# Patient Record
Sex: FEMALE | Race: WHITE | NOT HISPANIC OR LATINO | Employment: FULL TIME | URBAN - METROPOLITAN AREA
[De-identification: names, ages, dates, MRNs, and addresses within clinical notes are randomized per-mention and may not be internally consistent; named-entity substitution may affect disease eponyms.]

---

## 2017-01-05 ENCOUNTER — APPOINTMENT (OUTPATIENT)
Dept: PHYSICAL THERAPY | Facility: CLINIC | Age: 57
End: 2017-01-05
Payer: COMMERCIAL

## 2017-01-05 ENCOUNTER — APPOINTMENT (OUTPATIENT)
Dept: SPEECH THERAPY | Facility: CLINIC | Age: 57
End: 2017-01-05
Payer: COMMERCIAL

## 2017-01-05 PROCEDURE — 97532 HB COGNITIVE SKILLS DEVELOPMENT: CPT

## 2017-01-05 PROCEDURE — 95992 CANALITH REPOSITIONING PROC: CPT

## 2017-01-05 PROCEDURE — 97140 MANUAL THERAPY 1/> REGIONS: CPT

## 2017-01-05 PROCEDURE — 97164 PT RE-EVAL EST PLAN CARE: CPT

## 2017-01-19 ENCOUNTER — APPOINTMENT (OUTPATIENT)
Dept: SPEECH THERAPY | Facility: CLINIC | Age: 57
End: 2017-01-19
Payer: COMMERCIAL

## 2017-01-19 ENCOUNTER — APPOINTMENT (OUTPATIENT)
Dept: PHYSICAL THERAPY | Facility: CLINIC | Age: 57
End: 2017-01-19
Payer: COMMERCIAL

## 2017-01-19 PROCEDURE — 97112 NEUROMUSCULAR REEDUCATION: CPT

## 2017-01-19 PROCEDURE — 97532 HB COGNITIVE SKILLS DEVELOPMENT: CPT

## 2017-01-19 PROCEDURE — 97110 THERAPEUTIC EXERCISES: CPT

## 2017-01-20 ENCOUNTER — GENERIC CONVERSION - ENCOUNTER (OUTPATIENT)
Dept: OTHER | Facility: OTHER | Age: 57
End: 2017-01-20

## 2017-02-02 ENCOUNTER — APPOINTMENT (OUTPATIENT)
Dept: SPEECH THERAPY | Facility: CLINIC | Age: 57
End: 2017-02-02
Payer: COMMERCIAL

## 2017-02-02 ENCOUNTER — APPOINTMENT (OUTPATIENT)
Dept: PHYSICAL THERAPY | Facility: CLINIC | Age: 57
End: 2017-02-02
Payer: COMMERCIAL

## 2017-02-02 PROCEDURE — 97112 NEUROMUSCULAR REEDUCATION: CPT

## 2017-02-02 PROCEDURE — 97110 THERAPEUTIC EXERCISES: CPT

## 2017-02-02 PROCEDURE — 97164 PT RE-EVAL EST PLAN CARE: CPT

## 2017-02-02 PROCEDURE — 97532 HB COGNITIVE SKILLS DEVELOPMENT: CPT

## 2017-02-08 ENCOUNTER — ALLSCRIPTS OFFICE VISIT (OUTPATIENT)
Dept: OTHER | Facility: OTHER | Age: 57
End: 2017-02-08

## 2017-02-14 ENCOUNTER — GENERIC CONVERSION - ENCOUNTER (OUTPATIENT)
Dept: OTHER | Facility: OTHER | Age: 57
End: 2017-02-14

## 2017-02-16 ENCOUNTER — APPOINTMENT (OUTPATIENT)
Dept: PHYSICAL THERAPY | Facility: CLINIC | Age: 57
End: 2017-02-16
Payer: COMMERCIAL

## 2017-02-16 ENCOUNTER — APPOINTMENT (OUTPATIENT)
Dept: SPEECH THERAPY | Facility: CLINIC | Age: 57
End: 2017-02-16
Payer: COMMERCIAL

## 2017-02-16 PROCEDURE — 97532 HB COGNITIVE SKILLS DEVELOPMENT: CPT

## 2017-02-16 PROCEDURE — 97110 THERAPEUTIC EXERCISES: CPT

## 2017-02-16 PROCEDURE — 97112 NEUROMUSCULAR REEDUCATION: CPT

## 2017-02-21 ENCOUNTER — GENERIC CONVERSION - ENCOUNTER (OUTPATIENT)
Dept: OTHER | Facility: OTHER | Age: 57
End: 2017-02-21

## 2017-02-24 ENCOUNTER — GENERIC CONVERSION - ENCOUNTER (OUTPATIENT)
Dept: OTHER | Facility: OTHER | Age: 57
End: 2017-02-24

## 2017-03-02 ENCOUNTER — APPOINTMENT (OUTPATIENT)
Dept: PHYSICAL THERAPY | Facility: CLINIC | Age: 57
End: 2017-03-02
Payer: COMMERCIAL

## 2017-03-02 ENCOUNTER — APPOINTMENT (OUTPATIENT)
Dept: SPEECH THERAPY | Facility: CLINIC | Age: 57
End: 2017-03-02
Payer: COMMERCIAL

## 2017-03-02 PROCEDURE — 97532 HB COGNITIVE SKILLS DEVELOPMENT: CPT

## 2017-03-02 PROCEDURE — 97110 THERAPEUTIC EXERCISES: CPT

## 2017-03-02 PROCEDURE — 97164 PT RE-EVAL EST PLAN CARE: CPT

## 2017-03-02 PROCEDURE — 97112 NEUROMUSCULAR REEDUCATION: CPT

## 2017-03-03 ENCOUNTER — GENERIC CONVERSION - ENCOUNTER (OUTPATIENT)
Dept: OTHER | Facility: OTHER | Age: 57
End: 2017-03-03

## 2017-03-16 ENCOUNTER — APPOINTMENT (OUTPATIENT)
Dept: PHYSICAL THERAPY | Facility: CLINIC | Age: 57
End: 2017-03-16
Payer: COMMERCIAL

## 2017-04-13 ENCOUNTER — APPOINTMENT (OUTPATIENT)
Dept: PHYSICAL THERAPY | Facility: CLINIC | Age: 57
End: 2017-04-13
Payer: COMMERCIAL

## 2017-04-13 PROCEDURE — 97112 NEUROMUSCULAR REEDUCATION: CPT

## 2017-04-13 PROCEDURE — 97164 PT RE-EVAL EST PLAN CARE: CPT

## 2017-04-20 ENCOUNTER — APPOINTMENT (OUTPATIENT)
Dept: PHYSICAL THERAPY | Facility: CLINIC | Age: 57
End: 2017-04-20
Payer: COMMERCIAL

## 2017-04-27 ENCOUNTER — APPOINTMENT (OUTPATIENT)
Dept: PHYSICAL THERAPY | Facility: CLINIC | Age: 57
End: 2017-04-27
Payer: COMMERCIAL

## 2017-04-27 PROCEDURE — 97110 THERAPEUTIC EXERCISES: CPT

## 2017-04-27 PROCEDURE — 97112 NEUROMUSCULAR REEDUCATION: CPT

## 2017-05-03 ENCOUNTER — GENERIC CONVERSION - ENCOUNTER (OUTPATIENT)
Dept: OTHER | Facility: OTHER | Age: 57
End: 2017-05-03

## 2017-05-04 ENCOUNTER — APPOINTMENT (OUTPATIENT)
Dept: PHYSICAL THERAPY | Facility: CLINIC | Age: 57
End: 2017-05-04
Payer: COMMERCIAL

## 2017-05-04 PROCEDURE — 97112 NEUROMUSCULAR REEDUCATION: CPT

## 2017-05-04 PROCEDURE — 97110 THERAPEUTIC EXERCISES: CPT

## 2017-05-17 ENCOUNTER — ALLSCRIPTS OFFICE VISIT (OUTPATIENT)
Dept: OTHER | Facility: OTHER | Age: 57
End: 2017-05-17

## 2017-05-25 ENCOUNTER — APPOINTMENT (OUTPATIENT)
Dept: PHYSICAL THERAPY | Facility: CLINIC | Age: 57
End: 2017-05-25
Payer: COMMERCIAL

## 2017-05-25 PROCEDURE — 97110 THERAPEUTIC EXERCISES: CPT

## 2017-05-25 PROCEDURE — 97112 NEUROMUSCULAR REEDUCATION: CPT

## 2017-05-25 PROCEDURE — 97164 PT RE-EVAL EST PLAN CARE: CPT

## 2017-06-01 ENCOUNTER — GENERIC CONVERSION - ENCOUNTER (OUTPATIENT)
Dept: OTHER | Facility: OTHER | Age: 57
End: 2017-06-01

## 2017-06-02 ENCOUNTER — GENERIC CONVERSION - ENCOUNTER (OUTPATIENT)
Dept: OTHER | Facility: OTHER | Age: 57
End: 2017-06-02

## 2017-06-08 ENCOUNTER — APPOINTMENT (OUTPATIENT)
Dept: PHYSICAL THERAPY | Facility: CLINIC | Age: 57
End: 2017-06-08
Payer: COMMERCIAL

## 2017-06-08 PROCEDURE — 97112 NEUROMUSCULAR REEDUCATION: CPT

## 2017-06-08 PROCEDURE — 97110 THERAPEUTIC EXERCISES: CPT

## 2017-06-15 ENCOUNTER — APPOINTMENT (OUTPATIENT)
Dept: PHYSICAL THERAPY | Facility: CLINIC | Age: 57
End: 2017-06-15
Payer: COMMERCIAL

## 2017-06-15 PROCEDURE — 97112 NEUROMUSCULAR REEDUCATION: CPT

## 2017-06-15 PROCEDURE — 97110 THERAPEUTIC EXERCISES: CPT

## 2017-06-23 ENCOUNTER — APPOINTMENT (OUTPATIENT)
Dept: PHYSICAL THERAPY | Facility: CLINIC | Age: 57
End: 2017-06-23
Payer: COMMERCIAL

## 2017-06-23 PROCEDURE — 97110 THERAPEUTIC EXERCISES: CPT

## 2017-06-23 PROCEDURE — 97112 NEUROMUSCULAR REEDUCATION: CPT

## 2017-06-29 ENCOUNTER — GENERIC CONVERSION - ENCOUNTER (OUTPATIENT)
Dept: OTHER | Facility: OTHER | Age: 57
End: 2017-06-29

## 2017-07-06 ENCOUNTER — APPOINTMENT (OUTPATIENT)
Dept: PHYSICAL THERAPY | Facility: CLINIC | Age: 57
End: 2017-07-06
Payer: COMMERCIAL

## 2017-07-06 PROCEDURE — 97112 NEUROMUSCULAR REEDUCATION: CPT

## 2017-07-06 PROCEDURE — 97110 THERAPEUTIC EXERCISES: CPT

## 2017-07-20 ENCOUNTER — APPOINTMENT (OUTPATIENT)
Dept: PHYSICAL THERAPY | Facility: CLINIC | Age: 57
End: 2017-07-20
Payer: COMMERCIAL

## 2017-07-27 ENCOUNTER — GENERIC CONVERSION - ENCOUNTER (OUTPATIENT)
Dept: OTHER | Facility: OTHER | Age: 57
End: 2017-07-27

## 2017-07-27 ENCOUNTER — APPOINTMENT (OUTPATIENT)
Dept: PHYSICAL THERAPY | Facility: CLINIC | Age: 57
End: 2017-07-27
Payer: COMMERCIAL

## 2017-07-27 PROCEDURE — 97112 NEUROMUSCULAR REEDUCATION: CPT

## 2017-07-27 PROCEDURE — 97164 PT RE-EVAL EST PLAN CARE: CPT

## 2017-07-31 ENCOUNTER — GENERIC CONVERSION - ENCOUNTER (OUTPATIENT)
Dept: OTHER | Facility: OTHER | Age: 57
End: 2017-07-31

## 2017-08-10 ENCOUNTER — APPOINTMENT (OUTPATIENT)
Dept: PHYSICAL THERAPY | Facility: CLINIC | Age: 57
End: 2017-08-10
Payer: COMMERCIAL

## 2017-08-10 PROCEDURE — 97112 NEUROMUSCULAR REEDUCATION: CPT

## 2017-08-10 PROCEDURE — 97110 THERAPEUTIC EXERCISES: CPT

## 2017-08-31 ENCOUNTER — GENERIC CONVERSION - ENCOUNTER (OUTPATIENT)
Dept: OTHER | Facility: OTHER | Age: 57
End: 2017-08-31

## 2017-08-31 ENCOUNTER — APPOINTMENT (OUTPATIENT)
Dept: PHYSICAL THERAPY | Facility: CLINIC | Age: 57
End: 2017-08-31
Payer: COMMERCIAL

## 2017-08-31 PROCEDURE — 97112 NEUROMUSCULAR REEDUCATION: CPT

## 2017-08-31 PROCEDURE — 97164 PT RE-EVAL EST PLAN CARE: CPT

## 2017-09-13 ENCOUNTER — TRANSCRIBE ORDERS (OUTPATIENT)
Dept: ADMINISTRATIVE | Facility: HOSPITAL | Age: 57
End: 2017-09-13

## 2017-09-13 DIAGNOSIS — R10.11 ABDOMINAL PAIN, RIGHT UPPER QUADRANT: Primary | ICD-10-CM

## 2017-09-14 ENCOUNTER — APPOINTMENT (OUTPATIENT)
Dept: PHYSICAL THERAPY | Facility: CLINIC | Age: 57
End: 2017-09-14
Payer: COMMERCIAL

## 2017-09-14 PROCEDURE — 97112 NEUROMUSCULAR REEDUCATION: CPT

## 2017-09-21 ENCOUNTER — APPOINTMENT (OUTPATIENT)
Dept: PHYSICAL THERAPY | Facility: CLINIC | Age: 57
End: 2017-09-21
Payer: COMMERCIAL

## 2017-09-21 ENCOUNTER — GENERIC CONVERSION - ENCOUNTER (OUTPATIENT)
Dept: OTHER | Facility: OTHER | Age: 57
End: 2017-09-21

## 2017-09-21 PROCEDURE — 97112 NEUROMUSCULAR REEDUCATION: CPT

## 2017-09-26 ENCOUNTER — HOSPITAL ENCOUNTER (OUTPATIENT)
Dept: RADIOLOGY | Age: 57
Discharge: HOME | End: 2017-09-26
Payer: COMMERCIAL

## 2017-09-26 DIAGNOSIS — R10.11 ABDOMINAL PAIN, RIGHT UPPER QUADRANT: ICD-10-CM

## 2017-09-26 PROCEDURE — A9537 TC99M MEBROFENIN: HCPCS

## 2017-09-26 PROCEDURE — 78227 HEPATOBIL SYST IMAGE W/DRUG: CPT

## 2017-09-26 RX ADMIN — SINCALIDE 1 MCG: 5 INJECTION, POWDER, LYOPHILIZED, FOR SOLUTION INTRAVENOUS at 09:38

## 2017-09-28 ENCOUNTER — APPOINTMENT (OUTPATIENT)
Dept: PHYSICAL THERAPY | Facility: CLINIC | Age: 57
End: 2017-09-28
Payer: COMMERCIAL

## 2017-10-05 ENCOUNTER — APPOINTMENT (OUTPATIENT)
Dept: PHYSICAL THERAPY | Facility: CLINIC | Age: 57
End: 2017-10-05
Payer: COMMERCIAL

## 2017-10-05 ENCOUNTER — GENERIC CONVERSION - ENCOUNTER (OUTPATIENT)
Dept: OTHER | Facility: OTHER | Age: 57
End: 2017-10-05

## 2017-10-05 PROCEDURE — 97112 NEUROMUSCULAR REEDUCATION: CPT

## 2017-10-05 PROCEDURE — G8978 MOBILITY CURRENT STATUS: HCPCS

## 2017-10-05 PROCEDURE — 97164 PT RE-EVAL EST PLAN CARE: CPT

## 2017-10-05 PROCEDURE — G8979 MOBILITY GOAL STATUS: HCPCS

## 2017-10-06 ENCOUNTER — GENERIC CONVERSION - ENCOUNTER (OUTPATIENT)
Dept: OTHER | Facility: OTHER | Age: 57
End: 2017-10-06

## 2017-10-12 ENCOUNTER — APPOINTMENT (OUTPATIENT)
Dept: PHYSICAL THERAPY | Facility: CLINIC | Age: 57
End: 2017-10-12
Payer: COMMERCIAL

## 2017-10-12 PROCEDURE — 97110 THERAPEUTIC EXERCISES: CPT

## 2017-10-12 PROCEDURE — 97112 NEUROMUSCULAR REEDUCATION: CPT

## 2017-10-13 ENCOUNTER — GENERIC CONVERSION - ENCOUNTER (OUTPATIENT)
Dept: OTHER | Facility: OTHER | Age: 57
End: 2017-10-13

## 2017-10-19 ENCOUNTER — GENERIC CONVERSION - ENCOUNTER (OUTPATIENT)
Dept: OTHER | Facility: OTHER | Age: 57
End: 2017-10-19

## 2017-10-20 ENCOUNTER — APPOINTMENT (OUTPATIENT)
Dept: PHYSICAL THERAPY | Facility: CLINIC | Age: 57
End: 2017-10-20
Payer: COMMERCIAL

## 2017-10-20 PROCEDURE — 97110 THERAPEUTIC EXERCISES: CPT

## 2017-10-20 PROCEDURE — 97112 NEUROMUSCULAR REEDUCATION: CPT

## 2017-10-26 ENCOUNTER — APPOINTMENT (OUTPATIENT)
Dept: PHYSICAL THERAPY | Facility: CLINIC | Age: 57
End: 2017-10-26
Payer: COMMERCIAL

## 2017-10-26 PROCEDURE — 97110 THERAPEUTIC EXERCISES: CPT

## 2017-10-26 PROCEDURE — 97112 NEUROMUSCULAR REEDUCATION: CPT

## 2017-11-01 ENCOUNTER — GENERIC CONVERSION - ENCOUNTER (OUTPATIENT)
Dept: OTHER | Facility: OTHER | Age: 57
End: 2017-11-01

## 2017-11-09 ENCOUNTER — APPOINTMENT (OUTPATIENT)
Dept: PHYSICAL THERAPY | Facility: CLINIC | Age: 57
End: 2017-11-09
Payer: COMMERCIAL

## 2017-11-16 ENCOUNTER — GENERIC CONVERSION - ENCOUNTER (OUTPATIENT)
Dept: OTHER | Facility: OTHER | Age: 57
End: 2017-11-16

## 2017-11-16 ENCOUNTER — APPOINTMENT (OUTPATIENT)
Dept: PHYSICAL THERAPY | Facility: CLINIC | Age: 57
End: 2017-11-16
Payer: COMMERCIAL

## 2017-11-16 PROCEDURE — G8978 MOBILITY CURRENT STATUS: HCPCS

## 2017-11-16 PROCEDURE — G8979 MOBILITY GOAL STATUS: HCPCS

## 2017-11-16 PROCEDURE — 97164 PT RE-EVAL EST PLAN CARE: CPT

## 2017-11-16 PROCEDURE — 97112 NEUROMUSCULAR REEDUCATION: CPT

## 2017-11-22 ENCOUNTER — APPOINTMENT (OUTPATIENT)
Dept: PHYSICAL THERAPY | Facility: CLINIC | Age: 57
End: 2017-11-22
Payer: COMMERCIAL

## 2017-11-22 PROCEDURE — 97110 THERAPEUTIC EXERCISES: CPT

## 2017-11-22 PROCEDURE — 97112 NEUROMUSCULAR REEDUCATION: CPT

## 2017-11-29 ENCOUNTER — GENERIC CONVERSION - ENCOUNTER (OUTPATIENT)
Dept: OTHER | Facility: OTHER | Age: 57
End: 2017-11-29

## 2017-11-30 ENCOUNTER — APPOINTMENT (OUTPATIENT)
Dept: PHYSICAL THERAPY | Facility: CLINIC | Age: 57
End: 2017-11-30
Payer: COMMERCIAL

## 2017-11-30 PROCEDURE — 97112 NEUROMUSCULAR REEDUCATION: CPT

## 2018-01-10 NOTE — MISCELLANEOUS
Message      Recorded as Task   Date: 02/01/2016 11:00 AM, Created By: Sarahi Kapoor   Task Name: Med Renewal Request   Assigned To: Clif Fee   Regarding Patient: Janna Gomes, Status: Active   Comment:    Regine Ramírez - 01 Feb 2016 11:00 AM     TASK CREATED  Caller: Self; Renew Medication; [phone] (Home)  pt needs refills on Prempro   3/1 5-would like to switch to ktm-appt for Sept 2016-was not in last year to a brain injury-[facility]  pt only has 2 days left of pills  pt @[phone]  Marylene Noon - 01 Feb 2016 11:47 AM     TASK EDITED  Pt has not been here since 6/14 with EZE  Pt wants her prempro refilled   Also wants you to be her MD  Her appt with you isn't till  9/16  Can I refill her hrt till then? Brayan Boykin - 01 Feb 2016 6:31 PM     TASK REPLIED TO: Previously Assigned To Brayan Boykin  refill until scheduled appt   Cindy Costa - 02 Feb 2016 7:37 AM     TASK REPLIED TO: Previously Assigned To [facility],Team  Rx to emr until annual         Active Problems    1  Abdominal pain (789 00) (R10 9)   2  Abdominal pain, RUQ (789 01) (R10 11)   3  Alopecia areata (704 01) (L63 9)   4  Arthralgia of wrist, right (719 43) (M25 531)   5  Asthma (493 90) (J45 909)   6  Atypical face pain (350 2) (G50 1)   7  Chronic migraine without aura (346 70) (G43 709)   8  Cognitive dysfunction (294 9) (F09)   9  Common variable hypogammaglobulinemia (279 06) (D83 9)   10  Deep venous thrombosis of distal lower extremity (453 42) (I82 4Z9)   11  Depression with anxiety (300 4) (F41 8)   12  Distal radius fracture (813 42) (S52 509A)   13  Encounter for routine gynecological examination (V72 31) (Z01 419)   14  Encounter for screening mammogram for malignant neoplasm of breast (V76 12)    (Z12 31)   15  Forearm pain (729 5) (M79 639)   16  Gastritis (535 50) (K29 70)   17  Headache, chronic daily (784 0) (R51)   18  Hypothyroidism (244 9) (E03 9)   19   Insect bite (919 4,E906 4) (T14 8,W57  XXXA)   20  Insomnia (780 52) (G47 00)   21  Intractable chronic post-traumatic headache (339 22) (G44 321)   22  Left leg pain (729 5) (M79 605)   23  Memory disturbance (780 93) (R41 3)   24  Menopause (627 2)   25  Migraine without aura (346 10) (G43 009)   26  Oral thrush (112 0) (B37 0)   27  Osteopenia (733 90) (M85 80)   28  Osteoporosis (733 00) (M81 0)   29  Other muscle spasm (728 85) (M62 838)   30  Paresthesia (782 0) (R20 2)   31  Post concussion syndrome (310 2) (F07 81)   32  Prolapsing Mitral Valve Leaflet Syndrome (424 0)   33  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)   34  Swelling (782 3) (R60 9)   35  TBI (traumatic brain injury) (854 00) (S06 9X9A)   36  Tenosynovitis Of The Shoulder (727 00)   37  Trigeminal neuralgia (350 1) (G50 0)   38  Vaginal candidiasis (112 1) (B37 3)   39  Vaginitis (616 10) (N76 0)   40  Vitamin D deficiency (268 9) (E55 9)   41  Wrist pain, left (719 43) (M25 532)    Current Meds   1  Ambien TABS (Zolpidem Tartrate); Therapy: (Recorded:19Jun2015) to Recorded   2  Amitriptyline HCl - 10 MG Oral Tablet; TAKE 3 TABLETS AT BEDTIME; Therapy: 19HKE9198 to (Evaluate:99Gji9098)  Requested for: 78INS7352; Last   Rx:11Jan2016 Ordered   3  Multi-Vitamin TABS; Therapy: (UQGZWTJY:10GGW3544) to Recorded   4  OLANZapine 5 MG Oral Tablet; 1 po qd x 5 days; Therapy: 41Lyl9943 to (Evaluate:28Abk4831)  Requested for: 92VCY3830; Last   Rx:73Yta7312 Ordered   5  Prempro 0 3-1 5 MG Oral Tablet; sig 1 tablet daily; Therapy: 66HQB3459 to (Evaluate:29Jan2016)  Requested for: 84EAE0464; Last   Rx:30Nov2015 Ordered   6  Synthroid 50 MCG Oral Tablet (Levothyroxine Sodium); take 1 tablet by mouth once   daily; Therapy: 43Nce3402 to (Lázaro Stahl)  Requested for: 64HQN5253; Last   TK:28JEK6889 Ordered   7  TraMADol HCl - 50 MG Oral Tablet; Therapy: (Recorded:00Crt1251) to Recorded   8  Tylenol TABS; Therapy: (21 [phone]) to Recorded   9   Vitamin D TABS;   Therapy: (Recorded:23Jun2015) to Recorded   10  Zolpidem Tartrate 5 MG Oral Tablet; Therapy: 92Bkb3387 to (Last Rx:91Sze7759)  Requested for: 51Jcu6249 Ordered    Allergies    1  Cleocin CAPS    Plan  Chronic migraine without aura    · Chemodenervation of muscles innervated by facial, trigeminal, c-spine, accessory  nerves - POC; Status:Active; Requested for:79Som2824;   Encounter for routine gynecological examination    · Prempro 0 3-1 5 MG Oral Tablet; sig 1 tablet daily    Signatures   Electronically signed by :  Domitila Cordon, ; Feb 2 2016  7:38AM EST                       (Author)

## 2018-01-14 VITALS — DIASTOLIC BLOOD PRESSURE: 77 MMHG | SYSTOLIC BLOOD PRESSURE: 124 MMHG | TEMPERATURE: 98.2 F | HEART RATE: 78 BPM

## 2018-01-14 VITALS — DIASTOLIC BLOOD PRESSURE: 69 MMHG | SYSTOLIC BLOOD PRESSURE: 122 MMHG | TEMPERATURE: 98.5 F

## 2018-07-14 ENCOUNTER — OFFICE VISIT (OUTPATIENT)
Dept: URGENT CARE | Facility: CLINIC | Age: 58
End: 2018-07-14
Payer: COMMERCIAL

## 2018-07-14 VITALS
SYSTOLIC BLOOD PRESSURE: 120 MMHG | HEART RATE: 97 BPM | DIASTOLIC BLOOD PRESSURE: 66 MMHG | OXYGEN SATURATION: 99 % | TEMPERATURE: 99.6 F | BODY MASS INDEX: 18.55 KG/M2 | RESPIRATION RATE: 16 BRPM | WEIGHT: 122 LBS

## 2018-07-14 DIAGNOSIS — L03.90 CELLULITIS, UNSPECIFIED CELLULITIS SITE: Primary | ICD-10-CM

## 2018-07-14 PROCEDURE — 99203 OFFICE O/P NEW LOW 30 MIN: CPT | Performed by: PHYSICIAN ASSISTANT

## 2018-07-14 RX ORDER — UBIDECARENONE 75 MG
100 CAPSULE ORAL 2 TIMES WEEKLY
COMMUNITY

## 2018-07-14 RX ORDER — MULTIVITAMIN
TABLET ORAL
COMMUNITY

## 2018-07-14 RX ORDER — ACETAMINOPHEN 325 MG/1
650 TABLET ORAL
COMMUNITY

## 2018-07-14 RX ORDER — LEVOTHYROXINE SODIUM 0.05 MG/1
TABLET ORAL
COMMUNITY
Start: 2011-12-21 | End: 2019-11-18 | Stop reason: SDUPTHER

## 2018-07-14 RX ORDER — SERTRALINE HYDROCHLORIDE 25 MG/1
TABLET, FILM COATED ORAL
COMMUNITY
End: 2019-07-22 | Stop reason: CLARIF

## 2018-07-14 RX ORDER — AMITRIPTYLINE HYDROCHLORIDE 10 MG/1
2 TABLET, FILM COATED ORAL
COMMUNITY
Start: 2015-03-16 | End: 2019-11-18 | Stop reason: SDUPTHER

## 2018-07-14 RX ORDER — ZOLPIDEM TARTRATE 5 MG/1
TABLET ORAL
COMMUNITY
End: 2019-06-06 | Stop reason: CLARIF

## 2018-07-14 RX ORDER — SULFAMETHOXAZOLE AND TRIMETHOPRIM 800; 160 MG/1; MG/1
1 TABLET ORAL EVERY 12 HOURS SCHEDULED
Qty: 14 TABLET | Refills: 0 | Status: SHIPPED | OUTPATIENT
Start: 2018-07-14 | End: 2018-07-21

## 2018-07-14 NOTE — PROGRESS NOTES
[facility] [employer] [facility]        NAME: Ramona Campuzano is a 61 y o  female  : 1960    MRN: [id number]  DATE: 2018  TIME: 8:22 AM    Assessment and Plan   Cellulitis, unspecified cellulitis site [L03 90]  1  Cellulitis, unspecified cellulitis site  sulfamethoxazole-trimethoprim (BACTRIM DS) 800-160 mg per tablet     Patient Instructions     Take antibiotic as prescribed  Take benadryl for itching  Follow up with PCP in 3-5 days  Proceed to  ER if symptoms worsen  Chief Complaint     Chief Complaint   Patient presents with    Rash     right buttocks     History of Present Illness       Rash   This is a new problem  Episode onset: 1 week ago  The problem has been gradually worsening since onset  The affected locations include the right buttock  The rash is characterized by redness, itchiness and draining  She was exposed to nothing  Pertinent negatives include no anorexia, congestion, cough, diarrhea, eye pain, facial edema, fatigue, fever, joint pain, nail changes, rhinorrhea, shortness of breath, sore throat or vomiting  Past treatments include antibiotic cream  The treatment provided no relief  There is no history of allergies, asthma, eczema or varicella  Review of Systems   Review of Systems   Constitutional: Negative for activity change, appetite change, chills, diaphoresis, fatigue, fever and unexpected weight change  HENT: Negative for congestion, rhinorrhea and sore throat  Eyes: Negative for pain  Respiratory: Negative for cough and shortness of breath  Gastrointestinal: Negative for anorexia, diarrhea and vomiting  Musculoskeletal: Negative for joint pain  Skin: Positive for rash  Negative for color change, nail changes, pallor and wound           Current Medications       Current Outpatient Prescriptions:     acetaminophen (TYLENOL) 325 mg tablet, Take by mouth, Disp: , Rfl:     amitriptyline (ELAVIL) 10 mg tablet, Take 3 tablets by mouth, Disp: , Rfl:    cholecalciferol (VITAMIN D3) 1,000 units tablet, Take by mouth, Disp: , Rfl:     cyanocobalamin (VITAMIN B-12) 100 mcg tablet, Take by mouth, Disp: , Rfl:     estrogen, conjugated,-medroxyprogesterone (PREMPRO) 0 3-1 5 MG per tablet, Take by mouth, Disp: , Rfl:     levothyroxine (SYNTHROID) 50 mcg tablet, Take by mouth, Disp: , Rfl:     Multiple Vitamin (MULTI-VITAMIN DAILY) TABS, Take by mouth, Disp: , Rfl:     sertraline (ZOLOFT) 25 mg tablet, Take by mouth, Disp: , Rfl:     zolpidem (AMBIEN) 5 mg tablet, Take by mouth, Disp: , Rfl:     sulfamethoxazole-trimethoprim (BACTRIM DS) 800-160 mg per tablet, Take 1 tablet by mouth every 12 (twelve) hours for 7 days, Disp: 14 tablet, Rfl: 0    Current Allergies     Allergies as of 07/14/2018 - Reviewed 07/14/2018   Allergen Reaction Noted    Clindamycin GI Intolerance and Vomiting 04/21/2012            The following portions of the patient's history were reviewed and updated as appropriate: allergies, current medications, past family history, past medical history, past social history, past surgical history and problem list      History reviewed  No pertinent past medical history  History reviewed  No pertinent surgical history  History reviewed  No pertinent family history  Medications have been verified  Objective   /66   Pulse 97   Temp 99 6 °F (37 6 °C)   Resp 16   Wt 55 3 kg (122 lb)   SpO2 99%   BMI 18 55 kg/m²        Physical Exam     Physical Exam   Constitutional: She is oriented to person, place, and time  She appears well-developed and well-nourished  Cardiovascular: Normal rate, regular rhythm, normal heart sounds and intact distal pulses  Exam reveals no gallop and no friction rub  No murmur heard  Pulmonary/Chest: Effort normal and breath sounds normal  No respiratory distress  She has no wheezes  She has no rales  Abdominal: Soft  Bowel sounds are normal  She exhibits no distension  There is no tenderness   There is no rebound and no guarding  Neurological: She is alert and oriented to person, place, and time  She displays normal reflexes  No cranial nerve deficit  She exhibits normal muscle tone   Coordination normal    Skin:

## 2018-08-01 ENCOUNTER — APPOINTMENT (OUTPATIENT)
Dept: LAB | Facility: HOSPITAL | Age: 58
End: 2018-08-01
Payer: COMMERCIAL

## 2018-08-01 ENCOUNTER — TRANSCRIBE ORDERS (OUTPATIENT)
Dept: ADMINISTRATIVE | Facility: HOSPITAL | Age: 58
End: 2018-08-01

## 2018-08-01 DIAGNOSIS — R50.9 FEVER, UNSPECIFIED FEVER CAUSE: Primary | ICD-10-CM

## 2018-08-01 LAB
BACTERIA UR QL AUTO: ABNORMAL /HPF
BILIRUB UR QL STRIP: NEGATIVE
CLARITY UR: CLEAR
COLOR UR: YELLOW
GLUCOSE UR STRIP-MCNC: NEGATIVE MG/DL
HGB UR QL STRIP.AUTO: NEGATIVE
KETONES UR STRIP-MCNC: NEGATIVE MG/DL
LEUKOCYTE ESTERASE UR QL STRIP: ABNORMAL
NITRITE UR QL STRIP: NEGATIVE
NON-SQ EPI CELLS URNS QL MICRO: ABNORMAL /HPF
PH UR STRIP.AUTO: 6 [PH] (ref 5–8)
PROT UR STRIP-MCNC: NEGATIVE MG/DL
RBC #/AREA URNS AUTO: ABNORMAL /HPF
SP GR UR STRIP.AUTO: 1.01 (ref 1–1.03)
UROBILINOGEN UR QL STRIP.AUTO: 0.2 E.U./DL
WBC #/AREA URNS AUTO: ABNORMAL /HPF

## 2018-08-01 PROCEDURE — 87086 URINE CULTURE/COLONY COUNT: CPT

## 2018-08-01 PROCEDURE — 81001 URINALYSIS AUTO W/SCOPE: CPT

## 2018-08-02 LAB — BACTERIA UR CULT: NORMAL

## 2018-08-22 ENCOUNTER — APPOINTMENT (OUTPATIENT)
Dept: LAB | Facility: HOSPITAL | Age: 58
End: 2018-08-22
Payer: COMMERCIAL

## 2018-08-22 ENCOUNTER — TRANSCRIBE ORDERS (OUTPATIENT)
Dept: ADMINISTRATIVE | Facility: HOSPITAL | Age: 58
End: 2018-08-22

## 2018-08-22 DIAGNOSIS — R50.9 FEVER, UNSPECIFIED FEVER CAUSE: ICD-10-CM

## 2018-08-22 DIAGNOSIS — R50.9 FEVER, UNSPECIFIED FEVER CAUSE: Primary | ICD-10-CM

## 2018-08-22 LAB
ALBUMIN SERPL BCP-MCNC: 4.4 G/DL (ref 3.5–5.7)
ALP SERPL-CCNC: 40 U/L (ref 40–150)
ALT SERPL W P-5'-P-CCNC: 12 U/L (ref 7–52)
ANION GAP SERPL CALCULATED.3IONS-SCNC: 4 MMOL/L (ref 4–13)
AST SERPL W P-5'-P-CCNC: 20 U/L (ref 13–39)
BILIRUB SERPL-MCNC: 0.4 MG/DL (ref 0.2–1)
BUN SERPL-MCNC: 14 MG/DL (ref 7–25)
CALCIUM SERPL-MCNC: 9.5 MG/DL (ref 8.6–10.5)
CHLORIDE SERPL-SCNC: 105 MMOL/L (ref 98–107)
CO2 SERPL-SCNC: 30 MMOL/L (ref 21–31)
CREAT SERPL-MCNC: 1.06 MG/DL (ref 0.6–1.2)
CRP SERPL QL: <3 MG/L
ERYTHROCYTE [DISTWIDTH] IN BLOOD BY AUTOMATED COUNT: 13.3 % (ref 11.5–14.5)
ERYTHROCYTE [SEDIMENTATION RATE] IN BLOOD: 13 MM/HOUR (ref 0–30)
GFR SERPL CREATININE-BSD FRML MDRD: 58 ML/MIN/1.73SQ M
GLUCOSE SERPL-MCNC: 95 MG/DL (ref 65–99)
HCT VFR BLD AUTO: 37.6 % (ref 34.8–46.1)
HGB BLD-MCNC: 12.9 G/DL (ref 12–16)
MCH RBC QN AUTO: 31.2 PG (ref 26–34)
MCHC RBC AUTO-ENTMCNC: 34.2 G/DL (ref 31–37)
MCV RBC AUTO: 91 FL (ref 81–99)
PLATELET # BLD AUTO: 196 THOUSANDS/UL (ref 149–390)
PMV BLD AUTO: 9.2 FL (ref 8.6–11.7)
POTASSIUM SERPL-SCNC: 4.2 MMOL/L (ref 3.5–5.5)
PROT SERPL-MCNC: 6.9 G/DL (ref 6.4–8.9)
RBC # BLD AUTO: 4.12 MILLION/UL (ref 3.9–5.2)
SODIUM SERPL-SCNC: 139 MMOL/L (ref 134–143)
WBC # BLD AUTO: 4.8 THOUSAND/UL (ref 4.8–10.8)

## 2018-08-22 PROCEDURE — 85027 COMPLETE CBC AUTOMATED: CPT

## 2018-08-22 PROCEDURE — 86140 C-REACTIVE PROTEIN: CPT

## 2018-08-22 PROCEDURE — 87476 LYME DIS DNA AMP PROBE: CPT

## 2018-08-22 PROCEDURE — 80053 COMPREHEN METABOLIC PANEL: CPT

## 2018-08-22 PROCEDURE — 85652 RBC SED RATE AUTOMATED: CPT

## 2018-08-22 PROCEDURE — 36415 COLL VENOUS BLD VENIPUNCTURE: CPT

## 2018-08-27 LAB — MISCELLANEOUS LAB TEST RESULT: NORMAL

## 2019-04-01 ENCOUNTER — TRANSCRIBE ORDERS (OUTPATIENT)
Dept: ADMINISTRATIVE | Facility: HOSPITAL | Age: 59
End: 2019-04-01

## 2019-04-01 ENCOUNTER — HOSPITAL ENCOUNTER (OUTPATIENT)
Dept: RADIOLOGY | Facility: HOSPITAL | Age: 59
Discharge: HOME | End: 2019-04-01
Payer: COMMERCIAL

## 2019-04-01 DIAGNOSIS — S20.211A CONTUSION OF RIB ON RIGHT SIDE, INITIAL ENCOUNTER: Primary | ICD-10-CM

## 2019-04-01 DIAGNOSIS — S20.211A CONTUSION OF RIB ON RIGHT SIDE, INITIAL ENCOUNTER: ICD-10-CM

## 2019-04-01 PROCEDURE — 71101 X-RAY EXAM UNILAT RIBS/CHEST: CPT

## 2019-04-24 ENCOUNTER — APPOINTMENT (OUTPATIENT)
Dept: LAB | Facility: CLINIC | Age: 59
End: 2019-04-24
Payer: COMMERCIAL

## 2019-04-24 ENCOUNTER — TRANSCRIBE ORDERS (OUTPATIENT)
Dept: LAB | Facility: CLINIC | Age: 59
End: 2019-04-24

## 2019-04-24 DIAGNOSIS — E06.3 CHRONIC LYMPHOCYTIC THYROIDITIS: ICD-10-CM

## 2019-04-24 DIAGNOSIS — E55.9 VITAMIN D DEFICIENCY: ICD-10-CM

## 2019-04-24 DIAGNOSIS — E06.3 CHRONIC LYMPHOCYTIC THYROIDITIS: Primary | ICD-10-CM

## 2019-04-24 DIAGNOSIS — Z79.899 ENCOUNTER FOR LONG-TERM (CURRENT) USE OF OTHER MEDICATIONS: ICD-10-CM

## 2019-04-24 DIAGNOSIS — E78.2 MIXED HYPERLIPIDEMIA: ICD-10-CM

## 2019-04-24 LAB
ALBUMIN SERPL BCP-MCNC: 4.3 G/DL (ref 3.5–5)
ALP SERPL-CCNC: 67 U/L (ref 46–116)
ALT SERPL W P-5'-P-CCNC: 21 U/L (ref 12–78)
ANION GAP SERPL CALCULATED.3IONS-SCNC: 5 MMOL/L (ref 4–13)
AST SERPL W P-5'-P-CCNC: 21 U/L (ref 5–45)
BASOPHILS # BLD AUTO: 0.03 THOUSANDS/ΜL (ref 0–0.1)
BASOPHILS NFR BLD AUTO: 1 % (ref 0–1)
BILIRUB SERPL-MCNC: 0.44 MG/DL (ref 0.2–1)
BUN SERPL-MCNC: 21 MG/DL (ref 5–25)
CALCIUM SERPL-MCNC: 8.9 MG/DL (ref 8.3–10.1)
CHLORIDE SERPL-SCNC: 105 MMOL/L (ref 100–108)
CHOLEST SERPL-MCNC: 207 MG/DL (ref 50–200)
CO2 SERPL-SCNC: 27 MMOL/L (ref 21–32)
CREAT SERPL-MCNC: 0.95 MG/DL (ref 0.6–1.3)
EOSINOPHIL # BLD AUTO: 0.03 THOUSAND/ΜL (ref 0–0.61)
EOSINOPHIL NFR BLD AUTO: 1 % (ref 0–6)
ERYTHROCYTE [DISTWIDTH] IN BLOOD BY AUTOMATED COUNT: 13.2 % (ref 11.6–15.1)
GFR SERPL CREATININE-BSD FRML MDRD: 66 ML/MIN/1.73SQ M
GLUCOSE P FAST SERPL-MCNC: 76 MG/DL (ref 65–99)
HCT VFR BLD AUTO: 38.6 % (ref 34.8–46.1)
HDLC SERPL-MCNC: 84 MG/DL (ref 40–60)
HGB BLD-MCNC: 12.8 G/DL (ref 11.5–15.4)
IMM GRANULOCYTES # BLD AUTO: 0.01 THOUSAND/UL (ref 0–0.2)
IMM GRANULOCYTES NFR BLD AUTO: 0 % (ref 0–2)
LDLC SERPL CALC-MCNC: 112 MG/DL (ref 0–100)
LYMPHOCYTES # BLD AUTO: 1.71 THOUSANDS/ΜL (ref 0.6–4.47)
LYMPHOCYTES NFR BLD AUTO: 30 % (ref 14–44)
MCH RBC QN AUTO: 30.3 PG (ref 26.8–34.3)
MCHC RBC AUTO-ENTMCNC: 33.2 G/DL (ref 31.4–37.4)
MCV RBC AUTO: 92 FL (ref 82–98)
MONOCYTES # BLD AUTO: 0.37 THOUSAND/ΜL (ref 0.17–1.22)
MONOCYTES NFR BLD AUTO: 6 % (ref 4–12)
NEUTROPHILS # BLD AUTO: 3.6 THOUSANDS/ΜL (ref 1.85–7.62)
NEUTS SEG NFR BLD AUTO: 62 % (ref 43–75)
NONHDLC SERPL-MCNC: 123 MG/DL
NRBC BLD AUTO-RTO: 0 /100 WBCS
PLATELET # BLD AUTO: 204 THOUSANDS/UL (ref 149–390)
PMV BLD AUTO: 11.4 FL (ref 8.9–12.7)
POTASSIUM SERPL-SCNC: 3.7 MMOL/L (ref 3.5–5.3)
PROT SERPL-MCNC: 7.3 G/DL (ref 6.4–8.2)
RBC # BLD AUTO: 4.22 MILLION/UL (ref 3.81–5.12)
SODIUM SERPL-SCNC: 137 MMOL/L (ref 136–145)
T3 SERPL-MCNC: 1 NG/ML (ref 0.6–1.8)
T4 SERPL-MCNC: 11.2 UG/DL (ref 4.7–13.3)
TRIGL SERPL-MCNC: 55 MG/DL
TSH SERPL DL<=0.05 MIU/L-ACNC: 2.42 UIU/ML (ref 0.36–3.74)
WBC # BLD AUTO: 5.75 THOUSAND/UL (ref 4.31–10.16)

## 2019-04-24 PROCEDURE — 84436 ASSAY OF TOTAL THYROXINE: CPT

## 2019-04-24 PROCEDURE — 84480 ASSAY TRIIODOTHYRONINE (T3): CPT

## 2019-04-24 PROCEDURE — 85025 COMPLETE CBC W/AUTO DIFF WBC: CPT

## 2019-04-24 PROCEDURE — 82652 VIT D 1 25-DIHYDROXY: CPT

## 2019-04-24 PROCEDURE — 36415 COLL VENOUS BLD VENIPUNCTURE: CPT

## 2019-04-24 PROCEDURE — 80053 COMPREHEN METABOLIC PANEL: CPT

## 2019-04-24 PROCEDURE — 80061 LIPID PANEL: CPT

## 2019-04-24 PROCEDURE — 84443 ASSAY THYROID STIM HORMONE: CPT

## 2019-04-26 LAB — 1,25(OH)2D3 SERPL-MCNC: 56.7 PG/ML (ref 19.9–79.3)

## 2019-06-06 ENCOUNTER — OFFICE VISIT (OUTPATIENT)
Dept: FAMILY MEDICINE CLINIC | Facility: CLINIC | Age: 59
End: 2019-06-06
Payer: COMMERCIAL

## 2019-06-06 VITALS
HEIGHT: 68 IN | BODY MASS INDEX: 22.25 KG/M2 | WEIGHT: 146.8 LBS | SYSTOLIC BLOOD PRESSURE: 112 MMHG | DIASTOLIC BLOOD PRESSURE: 70 MMHG

## 2019-06-06 DIAGNOSIS — S06.9X9D TRAUMATIC BRAIN INJURY WITH LOSS OF CONSCIOUSNESS, SUBSEQUENT ENCOUNTER: Primary | ICD-10-CM

## 2019-06-06 PROBLEM — E06.3 HASHIMOTO'S DISEASE: Status: ACTIVE | Noted: 2019-06-06

## 2019-06-06 PROBLEM — G43.009 MIGRAINE WITHOUT AURA OR STATUS MIGRAINOSUS: Status: ACTIVE | Noted: 2019-06-06

## 2019-06-06 PROBLEM — G50.0 TRIGEMINAL NEURALGIA: Status: ACTIVE | Noted: 2019-06-06

## 2019-06-06 PROBLEM — S06.9X9A TRAUMATIC BRAIN INJURY (HCC): Status: ACTIVE | Noted: 2019-06-06

## 2019-06-06 PROBLEM — K21.9 GASTROESOPHAGEAL REFLUX DISEASE WITHOUT ESOPHAGITIS: Status: ACTIVE | Noted: 2019-06-06

## 2019-06-06 PROBLEM — S06.9XAA TRAUMATIC BRAIN INJURY: Status: ACTIVE | Noted: 2019-06-06

## 2019-06-06 PROBLEM — F90.9 ADHD: Status: ACTIVE | Noted: 2019-06-06

## 2019-06-06 PROBLEM — D83.9 COMMON VARIABLE IMMUNODEFICIENCY (HCC): Status: ACTIVE | Noted: 2019-06-06

## 2019-06-06 PROBLEM — E55.9 VITAMIN D DEFICIENCY: Status: ACTIVE | Noted: 2019-06-06

## 2019-06-06 PROCEDURE — 99213 OFFICE O/P EST LOW 20 MIN: CPT | Performed by: FAMILY MEDICINE

## 2019-06-06 RX ORDER — AMITRIPTYLINE HYDROCHLORIDE 10 MG/1
10 TABLET, FILM COATED ORAL
COMMUNITY
End: 2019-11-18 | Stop reason: SDUPTHER

## 2019-06-06 RX ORDER — LEVOTHYROXINE SODIUM 0.05 MG/1
50 TABLET ORAL DAILY
COMMUNITY
End: 2019-11-18 | Stop reason: SDUPTHER

## 2019-06-06 RX ORDER — RIZATRIPTAN BENZOATE 5 MG/1
5 TABLET ORAL AS NEEDED
COMMUNITY

## 2019-06-06 RX ORDER — PANTOPRAZOLE SODIUM 40 MG/1
40 TABLET, DELAYED RELEASE ORAL DAILY
Refills: 2 | COMMUNITY
Start: 2019-04-18 | End: 2020-10-28 | Stop reason: SDUPTHER

## 2019-06-06 RX ORDER — PREDNISOLONE ACETATE 10 MG/ML
SUSPENSION/ DROPS OPHTHALMIC
Refills: 0 | COMMUNITY
Start: 2019-05-08 | End: 2022-02-24 | Stop reason: SDUPTHER

## 2019-06-06 RX ORDER — FLUCONAZOLE 150 MG/1
TABLET ORAL
COMMUNITY
End: 2021-03-26 | Stop reason: SDUPTHER

## 2019-06-06 RX ORDER — ZOLPIDEM TARTRATE 10 MG/1
TABLET ORAL
Refills: 1 | COMMUNITY
Start: 2019-05-09 | End: 2019-08-07 | Stop reason: SDUPTHER

## 2019-06-06 RX ORDER — ARIPIPRAZOLE 2 MG/1
TABLET ORAL
Refills: 2 | COMMUNITY
Start: 2019-05-09 | End: 2019-06-07 | Stop reason: SDUPTHER

## 2019-06-06 RX ORDER — ACETAMINOPHEN 500 MG
1000 TABLET ORAL EVERY 4 HOURS PRN
COMMUNITY
End: 2021-03-26 | Stop reason: DRUGHIGH

## 2019-06-06 RX ORDER — INDOMETHACIN 50 MG/1
50 CAPSULE ORAL AS NEEDED
COMMUNITY
End: 2022-02-24

## 2019-06-07 DIAGNOSIS — S06.9X9D TRAUMATIC BRAIN INJURY WITH LOSS OF CONSCIOUSNESS, SUBSEQUENT ENCOUNTER: Primary | ICD-10-CM

## 2019-06-07 RX ORDER — ARIPIPRAZOLE 2 MG/1
2 TABLET ORAL 2 TIMES DAILY
Qty: 60 TABLET | Refills: 2 | Status: SHIPPED | OUTPATIENT
Start: 2019-06-07 | End: 2019-11-18 | Stop reason: SINTOL

## 2019-06-21 ENCOUNTER — OFFICE VISIT (OUTPATIENT)
Dept: FAMILY MEDICINE CLINIC | Facility: CLINIC | Age: 59
End: 2019-06-21
Payer: COMMERCIAL

## 2019-06-21 VITALS
SYSTOLIC BLOOD PRESSURE: 108 MMHG | DIASTOLIC BLOOD PRESSURE: 72 MMHG | BODY MASS INDEX: 23.15 KG/M2 | WEIGHT: 147.5 LBS | HEIGHT: 67 IN | TEMPERATURE: 99.2 F

## 2019-06-21 DIAGNOSIS — J20.8 ACUTE BRONCHITIS DUE TO OTHER SPECIFIED ORGANISMS: ICD-10-CM

## 2019-06-21 DIAGNOSIS — D83.9 COMMON VARIABLE IMMUNODEFICIENCY (HCC): ICD-10-CM

## 2019-06-21 DIAGNOSIS — J20.9 ACUTE BRONCHITIS, UNSPECIFIED ORGANISM: Primary | ICD-10-CM

## 2019-06-21 PROCEDURE — 1036F TOBACCO NON-USER: CPT | Performed by: FAMILY MEDICINE

## 2019-06-21 PROCEDURE — 3008F BODY MASS INDEX DOCD: CPT | Performed by: FAMILY MEDICINE

## 2019-06-21 PROCEDURE — 99213 OFFICE O/P EST LOW 20 MIN: CPT | Performed by: FAMILY MEDICINE

## 2019-06-21 RX ORDER — MOXIFLOXACIN HYDROCHLORIDE 400 MG/1
400 TABLET ORAL DAILY
Qty: 10 TABLET | Refills: 0 | Status: SHIPPED | OUTPATIENT
Start: 2019-06-21 | End: 2019-07-01

## 2019-06-21 RX ORDER — BENZONATATE 100 MG/1
200 CAPSULE ORAL 3 TIMES DAILY PRN
Qty: 40 CAPSULE | Refills: 0 | Status: SHIPPED | OUTPATIENT
Start: 2019-06-21 | End: 2019-07-05

## 2019-06-23 PROBLEM — J20.8 ACUTE BRONCHITIS DUE TO OTHER SPECIFIED ORGANISMS: Status: ACTIVE | Noted: 2019-06-23

## 2019-06-25 ENCOUNTER — TELEPHONE (OUTPATIENT)
Dept: FAMILY MEDICINE CLINIC | Facility: CLINIC | Age: 59
End: 2019-06-25

## 2019-06-25 NOTE — TELEPHONE ENCOUNTER
PATIENT CALLED THIS AM TO LET US KNOW SHE WAS NOT SEEING DR FRANCIS DUE TO OUT 8 WEEKS  SHE HAS AN APPT  WITH DR NICKERSON ON 07/03/2019@ [facility]

## 2019-07-08 ENCOUNTER — TELEPHONE (OUTPATIENT)
Dept: FAMILY MEDICINE CLINIC | Facility: CLINIC | Age: 59
End: 2019-07-08

## 2019-07-08 DIAGNOSIS — A49.02 MRSA (METHICILLIN RESISTANT STAPHYLOCOCCUS AUREUS): Primary | ICD-10-CM

## 2019-07-08 RX ORDER — SULFAMETHOXAZOLE AND TRIMETHOPRIM 800; 160 MG/1; MG/1
1 TABLET ORAL EVERY 12 HOURS SCHEDULED
Qty: 20 TABLET | Refills: 0 | Status: SHIPPED | OUTPATIENT
Start: 2019-07-08 | End: 2019-07-08

## 2019-07-08 RX ORDER — SULFAMETHOXAZOLE AND TRIMETHOPRIM 800; 160 MG/1; MG/1
1 TABLET ORAL EVERY 12 HOURS SCHEDULED
Qty: 20 TABLET | Refills: 0 | Status: SHIPPED | OUTPATIENT
Start: 2019-07-08 | End: 2019-07-18

## 2019-07-08 NOTE — TELEPHONE ENCOUNTER
PT CALLED STATING SHE WOULD LIKE FOR YOU TO CALL IN BACTRIM FOR HER  SHE STATES SHE HAS ANOTHER SORE ON HER BUTTOCKS AND THIS IS WHAT YOU GAVE HER FOR IT THE LAST TIME  YOU GAVE HER SULFAMETHAXAZOLE 800 MG TRIMETHOPRIM 160 MG TABLET 1 PO BID  [facility] [address] [place] [address]

## 2019-07-22 DIAGNOSIS — F32.0 MAJOR DEPRESSIVE DISORDER, SINGLE EPISODE, MILD (HCC): Primary | ICD-10-CM

## 2019-08-07 DIAGNOSIS — G47.00 INSOMNIA, UNSPECIFIED TYPE: Primary | ICD-10-CM

## 2019-08-07 RX ORDER — ZOLPIDEM TARTRATE 10 MG/1
10 TABLET ORAL
Qty: 90 TABLET | Refills: 1 | Status: SHIPPED | OUTPATIENT
Start: 2019-08-07 | End: 2019-11-18 | Stop reason: HOSPADM

## 2019-08-07 NOTE — PROGRESS NOTES
The [place] prescription drug monitoring program was queried  There were no red flags  Safe to proceed

## 2019-11-18 ENCOUNTER — OFFICE VISIT (OUTPATIENT)
Dept: FAMILY MEDICINE CLINIC | Facility: CLINIC | Age: 59
End: 2019-11-18
Payer: COMMERCIAL

## 2019-11-18 VITALS
HEART RATE: 95 BPM | WEIGHT: 150.3 LBS | SYSTOLIC BLOOD PRESSURE: 110 MMHG | HEIGHT: 68 IN | DIASTOLIC BLOOD PRESSURE: 68 MMHG | OXYGEN SATURATION: 98 % | BODY MASS INDEX: 22.78 KG/M2

## 2019-11-18 DIAGNOSIS — E06.3 HASHIMOTO'S DISEASE: ICD-10-CM

## 2019-11-18 DIAGNOSIS — Z79.899 ENCOUNTER FOR LONG-TERM (CURRENT) USE OF OTHER MEDICATIONS: ICD-10-CM

## 2019-11-18 DIAGNOSIS — Z23 IMMUNIZATION DUE: ICD-10-CM

## 2019-11-18 DIAGNOSIS — F33.1 MODERATE EPISODE OF RECURRENT MAJOR DEPRESSIVE DISORDER (HCC): ICD-10-CM

## 2019-11-18 DIAGNOSIS — Z12.39 SCREENING FOR BREAST CANCER: Primary | ICD-10-CM

## 2019-11-18 DIAGNOSIS — S06.9X9D TRAUMATIC BRAIN INJURY WITH LOSS OF CONSCIOUSNESS, SUBSEQUENT ENCOUNTER: ICD-10-CM

## 2019-11-18 DIAGNOSIS — G47.00 INSOMNIA, UNSPECIFIED TYPE: ICD-10-CM

## 2019-11-18 DIAGNOSIS — G50.0 TRIGEMINAL NEURALGIA: ICD-10-CM

## 2019-11-18 DIAGNOSIS — D83.9 COMMON VARIABLE IMMUNODEFICIENCY (HCC): ICD-10-CM

## 2019-11-18 PROCEDURE — 99214 OFFICE O/P EST MOD 30 MIN: CPT | Performed by: FAMILY MEDICINE

## 2019-11-18 PROCEDURE — 90471 IMMUNIZATION ADMIN: CPT | Performed by: FAMILY MEDICINE

## 2019-11-18 PROCEDURE — 1036F TOBACCO NON-USER: CPT | Performed by: FAMILY MEDICINE

## 2019-11-18 PROCEDURE — 90682 RIV4 VACC RECOMBINANT DNA IM: CPT | Performed by: FAMILY MEDICINE

## 2019-11-18 RX ORDER — AMITRIPTYLINE HYDROCHLORIDE 10 MG/1
20 TABLET, FILM COATED ORAL
Qty: 180 TABLET | Refills: 3 | Status: SHIPPED | OUTPATIENT
Start: 2019-11-18 | End: 2020-12-04 | Stop reason: SDUPTHER

## 2019-11-18 RX ORDER — TEMAZEPAM 30 MG/1
30 CAPSULE ORAL
Qty: 30 CAPSULE | Refills: 2 | Status: SHIPPED | OUTPATIENT
Start: 2019-11-18 | End: 2019-12-04 | Stop reason: HOSPADM

## 2019-11-18 RX ORDER — LEVOTHYROXINE SODIUM 0.05 MG/1
50 TABLET ORAL DAILY
Qty: 90 TABLET | Refills: 3 | Status: SHIPPED | OUTPATIENT
Start: 2019-11-18 | End: 2020-12-04 | Stop reason: SDUPTHER

## 2019-11-18 NOTE — PROGRESS NOTES
Assessment/Plan:    Common variable immunodeficiency ([facility] [address] )  We discussed her common variable immune deficiency  I did not get a consultation letter from the immunologist she saw  However, she did bring with her results of her testing  I reviewed these and commented on them  Despite her immuno deficiency, I still recommend flu shot for her  She was agreeable and this was administered today  Traumatic brain injury [facility])  Patient's work as certainly made her symptoms much worse with regards to her traumatic brain injury  She has headaches  She has difficulty concentrating  She has fatigue  We discussed her work status again  At this point, I gave her a note that she is going to be out of work indefinitely  I once again advised her that she should consider social security disability  Insomnia  Patient has insomnia  She has been taking Ambien for quite a long period of time  I explained that Ambien can cause sleep walking  I recommended we change this to a different sleep agent  I am going to try her on temazepam 30 mg  We may need to get prior authorization from her insurance company  I scheduled her a follow-up visit to assess efficacy and make sure that her symptoms have resolved  Moderate episode of recurrent major depressive disorder (HCC)  Patient's depression and mood swings are as a consequence of her traumatic brain injury  I told her I want to hold off on prescribing her Zyprexa  I would like to try a different sleeping pill 1st and then we can consider trying this medication  I did explain potential side effects such as weight gain and diabetes  We will discuss this further when she returns in 4 weeks    She may need to see a psychiatrist     Garett caldwell  Thyroid function testing was ordered       Diagnoses and all orders for this visit:    Screening for breast cancer  -     Mammo screening bilateral w 3d & cad; Future    Immunization due  -     influenza vaccine, [id number], quadrivalent, recombinant, PF, 0 5 mL, for patients 18 yr+ (FLUBLOK)    Insomnia, unspecified type  -     temazepam (RESTORIL) 30 mg capsule; Take 1 capsule (30 mg total) by mouth daily at bedtime as needed for sleep    Trigeminal neuralgia  -     amitriptyline (ELAVIL) 10 mg tablet; Take 2 tablets (20 mg total) by mouth daily at bedtime    Hashimoto's disease  -     levothyroxine (SYNTHROID) 50 mcg tablet; Take 1 tablet (50 mcg total) by mouth daily  -     TSH, 3rd generation; Future  -     T4, free; Future    Encounter for long-term (current) use of other medications  -     CBC and differential; Future  -     Comprehensive metabolic panel; Future    Common variable immunodeficiency ([facility] [address] )  -     Comprehensive metabolic panel; Future    Traumatic brain injury with loss of consciousness, subsequent encounter    Moderate episode of recurrent major depressive disorder (HCC)          Subjective:      Patient ID: Bryant Mathias is a 60 y o  female  The patient is a 19-year-old white female who presents to the office today with several complaints  She tells me she recently saw an immunologist   She had negative allergy testing  She had a negative CT of the chest to rule out bronchiectasis  She had negative PFTs  She was diagnosed with non allergic rhinitis  He did blood work on her and found she was not immune to pneumococcal pneumonia  She was given Pneumovax  Blood work was later repeated and she was still not immune  She did not mount an immune response  Her 2nd concern was that she saw her neuro psychiatrist   She has severe sleep disturbance  She tells me that they told her to see me after she described to them  She takes Ambien but she has been sleep walking  She has also had episodes where she was dreaming but felt she was awake  Her 3rd concern was her trigeminal neuralgia pain  She tells me she has been getting Botox injections and she takes medication for but despite this, it is severe    She actually missed 1 and half weeks of work because of it  She tells me that she does not feel she is capable of returning to work anymore  Her next concern is gas and bloating in her abdomen  Next, she is concerned about mood swings  She wants to try Olanzapine  Apparently, she took this in the past and it worked well for her  She stopped taking it because she was unable to work while taking this medication  The following portions of the patient's history were reviewed and updated as appropriate: allergies, current medications, past family history, past medical history, past social history, past surgical history and problem list     Review of Systems   Constitutional: Positive for fatigue  Eyes: Positive for visual disturbance  Cardiovascular: Negative for chest pain, palpitations and leg swelling  Gastrointestinal: Negative for abdominal distention, abdominal pain, blood in stool, constipation, diarrhea, nausea and vomiting  Neurological: Positive for dizziness and headaches  Reports facial pain  Psychiatric/Behavioral: Positive for decreased concentration, dysphoric mood and sleep disturbance  Negative for confusion, self-injury and suicidal ideas  The patient is nervous/anxious  Objective:      /68 (BP Location: Left arm, Patient Position: Sitting, Cuff Size: Adult)   Pulse 95   Ht 5' 8" (1 727 m)   Wt 68 2 kg (150 lb 4 8 oz)   SpO2 98%   BMI 22 85 kg/m²          Physical Exam   Constitutional:   Patient is a pleasant 59-year-old white female who appears her stated age and is in no distress   HENT:   Head: Normocephalic and atraumatic  Right Ear: External ear normal    Left Ear: External ear normal    Mouth/Throat: Oropharynx is clear and moist  No oropharyngeal exudate  Tympanic membranes are clear   Eyes: Pupils are equal, round, and reactive to light  Conjunctivae are normal  No scleral icterus  Neck: Neck supple  No tracheal deviation present   No thyromegaly present  Cardiovascular: Normal rate, regular rhythm and normal heart sounds  Exam reveals no gallop and no friction rub  No murmur heard  Pulmonary/Chest: Effort normal and breath sounds normal  No stridor  No respiratory distress  She has no wheezes  She has no rales  Abdominal: Soft  Bowel sounds are normal  She exhibits no distension and no mass  There is no tenderness  There is no rebound and no guarding  There is no organomegaly noted   Lymphadenopathy:     She has no cervical adenopathy  Vitals reviewed      extremities:  Without cyanosis, clubbing, or edema

## 2019-11-18 NOTE — PATIENT INSTRUCTIONS
Insomnia   AMBULATORY CARE:   Insomnia  is a condition that makes it hard to fall or stay asleep  Lack of sleep can lead to attention or memory problems during the day  You may also be velazquez, depressed, clumsy, or have headaches  Contact your healthcare provider if:   · Your symptoms do not get better, or they get worse  · You begin to use drugs or alcohol to fall asleep  · You have questions or concerns about your condition or care  Medicines:   · Medicines  may help you sleep more regularly or help you feel less anxious  · Take your medicine as directed  Contact your healthcare provider if you think your medicine is not helping or if you have side effects  Tell him or her if you are allergic to any medicine  Keep a list of the medicines, vitamins, and herbs you take  Include the amounts, and when and why you take them  Bring the list or the pill bottles to follow-up visits  Carry your medicine list with you in case of an emergency  What you can do to improve your sleep:   · Create a sleep schedule  This will help you form a sleep routine  Keep a record of your sleep patterns, and any sleeping problems you have  Bring the record to follow-up visits with healthcare providers  · Do not take naps  Naps could make it hard for you to fall asleep at bedtime  · Keep your bedroom cool, quiet, and dark  Turn on white noise, such as a fan, to help you relax  Do not use your bed for any activity that will keep you awake  Do not read, exercise, eat, or watch TV in your bedroom  · Get up if you do not fall asleep within 20 minutes  Move to another room and do something relaxing until you become sleepy  · Limit caffeine, alcohol, and food to earlier in the day  Only drink caffeine in the morning  Do not drink alcohol within 6 hours of bedtime  Do not eat a heavy meal right before you go to bed  · Exercise regularly  Daily exercise may help you sleep better   Do not exercise within 4 hours of bedtime  Follow up with your healthcare provider as directed: Your healthcare provider may refer you for cognitive behavioral therapy  A behavioral therapist may help you find ways to relax, decrease stress, and improve sleep  Write down your questions so you remember to ask them during your visits  © 2017 [employer] Information is for End User's use only and may not be sold, redistributed or otherwise used for commercial purposes  All illustrations and images included in CareNotes® are the copyrighted property of [employer] , [employer]  or Yandel Garcia  The above information is an  only  It is not intended as medical advice for individual conditions or treatments  Talk to your doctor, nurse or pharmacist before following any medical regimen to see if it is safe and effective for you

## 2019-11-19 NOTE — ASSESSMENT & PLAN NOTE
Patient's depression and mood swings are as a consequence of her traumatic brain injury  I told her I want to hold off on prescribing her Zyprexa  I would like to try a different sleeping pill 1st and then we can consider trying this medication  I did explain potential side effects such as weight gain and diabetes  We will discuss this further when she returns in 4 weeks    She may need to see a psychiatrist

## 2019-11-19 NOTE — ASSESSMENT & PLAN NOTE
We discussed her common variable immune deficiency  I did not get a consultation letter from the immunologist she saw  However, she did bring with her results of her testing  I reviewed these and commented on them  Despite her immuno deficiency, I still recommend flu shot for her  She was agreeable and this was administered today

## 2019-11-19 NOTE — ASSESSMENT & PLAN NOTE
Patient's work as certainly made her symptoms much worse with regards to her traumatic brain injury  She has headaches  She has difficulty concentrating  She has fatigue  We discussed her work status again  At this point, I gave her a note that she is going to be out of work indefinitely  I once again advised her that she should consider social security disability

## 2019-11-19 NOTE — ASSESSMENT & PLAN NOTE
Patient has insomnia  She has been taking Ambien for quite a long period of time  I explained that Ambien can cause sleep walking  I recommended we change this to a different sleep agent  I am going to try her on temazepam 30 mg  We may need to get prior authorization from her insurance company  I scheduled her a follow-up visit to assess efficacy and make sure that her symptoms have resolved

## 2019-11-22 ENCOUNTER — TELEPHONE (OUTPATIENT)
Dept: FAMILY MEDICINE CLINIC | Facility: CLINIC | Age: 59
End: 2019-11-22

## 2019-11-22 NOTE — TELEPHONE ENCOUNTER
PT WAS STARTED ON LYRICA 75 MG CAPSULE BID  THE PAIN MANAGEMENT DOC WOULD LIKE TO KNOW IF SHE COULD HOLD OF ON THE TEMAZEPAM FOR AT LEAST A WEEK UNTIL THEY CAN SEE IF THERE ARE ANY SIDE AFFECTS FROM THIS

## 2019-11-25 ENCOUNTER — DOCUMENTATION (OUTPATIENT)
Dept: FAMILY MEDICINE CLINIC | Facility: CLINIC | Age: 59
End: 2019-11-25

## 2019-11-25 ENCOUNTER — TELEPHONE (OUTPATIENT)
Dept: FAMILY MEDICINE CLINIC | Facility: CLINIC | Age: 59
End: 2019-11-25

## 2019-11-25 DIAGNOSIS — L03.90 CELLULITIS, UNSPECIFIED CELLULITIS SITE: Primary | ICD-10-CM

## 2019-11-25 RX ORDER — SULFAMETHOXAZOLE AND TRIMETHOPRIM 800; 160 MG/1; MG/1
1 TABLET ORAL EVERY 12 HOURS SCHEDULED
Qty: 14 TABLET | Refills: 0 | Status: SHIPPED | OUTPATIENT
Start: 2019-11-25 | End: 2019-12-02

## 2019-11-25 NOTE — TELEPHONE ENCOUNTER
PT CALLED STATING SHE WOKE UP WITH ANOTHER SKIN INFECTION  WOULD LIKE FOR YOU TO CALL IN BACTRI FOR THIS  [facility] [address]

## 2019-12-02 ENCOUNTER — TELEPHONE (OUTPATIENT)
Dept: FAMILY MEDICINE CLINIC | Facility: CLINIC | Age: 59
End: 2019-12-02

## 2019-12-02 NOTE — TELEPHONE ENCOUNTER
PATIENT CALLED STATING THAT TEMAZEPAM 30 MG IS NOT WORKING AND WOULD [facility] [address] [place] [address]

## 2019-12-04 DIAGNOSIS — G47.00 INSOMNIA, UNSPECIFIED TYPE: Primary | ICD-10-CM

## 2019-12-04 RX ORDER — ESZOPICLONE 2 MG/1
2 TABLET, FILM COATED ORAL
Qty: 7 TABLET | Refills: 5 | Status: SHIPPED | OUTPATIENT
Start: 2019-12-04 | End: 2019-12-27 | Stop reason: SINTOL

## 2019-12-04 NOTE — PROGRESS NOTES
I spoke to the patient by phone today  She had taken Ambien for a long period of time which is no longer been working  We switched her to temazepam at the time of her last office visit  She found this to be ineffective  She wants to try something else  She is having problems with sleep  She has multiple issues  She reports increased pain in her face from her trigeminal neuralgia  She was started on Lyrica by her pain management doctor  She complains of arthralgias  She complains of insomnia  She was very concerned because when she awakens in the morning, she has trouble opening her eyes  She plans to see a neurologist and is considering seeing a sleep specialist   We discussed this  She is going to try to call somebody on her own  I told her she can't get in, she can call me and I will refer her to a [facility] neurologist   She has seen neurologist in the past and she is going to try who she saw in the past 1st   As far as this I prepped the, I want to hold off  I do not want to start her on a sleep aid and Zyprexa at the same time  Were going to try something for sleep 1st   I am going to prescribe Lunesta  She will let me know how it works  The [place] prescription drug monitoring program was queried  There were no red flags  Safe to proceed

## 2019-12-10 ENCOUNTER — TELEPHONE (OUTPATIENT)
Dept: FAMILY MEDICINE CLINIC | Facility: CLINIC | Age: 59
End: 2019-12-10

## 2019-12-10 NOTE — TELEPHONE ENCOUNTER
Pt wants you to know she get really bad violent nightmares from Lunesta 2mg for sleeping  She has an appt with Dr Kyra Cohen with [facility]  On Friday 12/20 for a consult      She will see you on the 27th

## 2019-12-27 ENCOUNTER — OFFICE VISIT (OUTPATIENT)
Dept: FAMILY MEDICINE CLINIC | Facility: CLINIC | Age: 59
End: 2019-12-27
Payer: COMMERCIAL

## 2019-12-27 VITALS
HEIGHT: 68 IN | BODY MASS INDEX: 22.76 KG/M2 | DIASTOLIC BLOOD PRESSURE: 68 MMHG | SYSTOLIC BLOOD PRESSURE: 112 MMHG | WEIGHT: 150.2 LBS | OXYGEN SATURATION: 99 % | HEART RATE: 59 BPM

## 2019-12-27 DIAGNOSIS — F33.1 MODERATE EPISODE OF RECURRENT MAJOR DEPRESSIVE DISORDER (HCC): ICD-10-CM

## 2019-12-27 DIAGNOSIS — E55.9 VITAMIN D DEFICIENCY: ICD-10-CM

## 2019-12-27 DIAGNOSIS — H02.9 EYELID ABNORMALITY: ICD-10-CM

## 2019-12-27 DIAGNOSIS — S06.9X9D TRAUMATIC BRAIN INJURY WITH LOSS OF CONSCIOUSNESS, SUBSEQUENT ENCOUNTER: Primary | ICD-10-CM

## 2019-12-27 DIAGNOSIS — G47.00 INSOMNIA, UNSPECIFIED TYPE: ICD-10-CM

## 2019-12-27 DIAGNOSIS — G50.0 TRIGEMINAL NEURALGIA: ICD-10-CM

## 2019-12-27 DIAGNOSIS — L03.119 CELLULITIS OF LOWER EXTREMITY, UNSPECIFIED LATERALITY: ICD-10-CM

## 2019-12-27 PROCEDURE — 99213 OFFICE O/P EST LOW 20 MIN: CPT | Performed by: FAMILY MEDICINE

## 2019-12-27 PROCEDURE — 1036F TOBACCO NON-USER: CPT | Performed by: FAMILY MEDICINE

## 2019-12-27 PROCEDURE — 3008F BODY MASS INDEX DOCD: CPT | Performed by: FAMILY MEDICINE

## 2019-12-27 RX ORDER — PREGABALIN 50 MG/1
1 CAPSULE ORAL 2 TIMES DAILY
Refills: 0 | COMMUNITY
Start: 2019-11-20 | End: 2021-03-26 | Stop reason: SDUPTHER

## 2019-12-27 RX ORDER — SULFAMETHOXAZOLE AND TRIMETHOPRIM 800; 160 MG/1; MG/1
1 TABLET ORAL EVERY 12 HOURS SCHEDULED
Qty: 20 TABLET | Refills: 0 | Status: SHIPPED | OUTPATIENT
Start: 2019-12-27 | End: 2020-01-06

## 2019-12-27 RX ORDER — ZOLPIDEM TARTRATE 10 MG/1
10 TABLET ORAL
COMMUNITY
End: 2020-01-27

## 2019-12-27 NOTE — ASSESSMENT & PLAN NOTE
The patient has been plagued by recurrent episodes of cellulitis because of her common variable immune deficiency  I ordered Bactrim DS  She wants to keep this on hand    I told her not to take it unless she contact me 1st

## 2019-12-27 NOTE — ASSESSMENT & PLAN NOTE
Patient has traumatic brain injury  She is actually feeling better since she stopped working  I advised her not to return to work  Her symptoms will surely worsen again  Patient wheeled to ED room minor 1 with neighbor, per neighbor \" we were just sitting there just talking and as soon as her  got home she passed out, she didn't fall or anything she just put her head down.\" patient stuttering and states \" chest pain and numbness\" while pointing to her legs and arms and chest.     Patient's  at bedside is unable to provide information and states \" She does this once in a while\" patient's  does not know medications she is on and patient is not talking

## 2019-12-27 NOTE — PROGRESS NOTES
Assessment/Plan:    Traumatic brain injury [facility])  Patient has traumatic brain injury  She is actually feeling better since she stopped working  I advised her not to return to work  Her symptoms will surely worsen again  Vitamin D deficiency  A repeat vitamin-D level has been ordered    Moderate episode of recurrent major depressive disorder ([address] )  Depression has improved as well since the patient is not working  I did suggest to the patient that her night terrors and hallucinations may be a result of the Ambien  She wants to continue the medicine and follow up with her neurologist/sleep specialist in this regard  Eyelid abnormality  Patient's neurologist has suggested that she may have myasthenia gravis  I ordered a cecal cholinesterase antibodies    Cellulitis of lower extremity  The patient has been plagued by recurrent episodes of cellulitis because of her common variable immune deficiency  I ordered Bactrim DS  She wants to keep this on hand  I told her not to take it unless she contact me 1st        Diagnoses and all orders for this visit:    Traumatic brain injury with loss of consciousness, subsequent encounter  -     Ferritin; Future  -     Cholinesterase, RBC; Future    Vitamin D deficiency  -     Vitamin D 25 hydroxy; Future    Trigeminal neuralgia  -     Ferritin; Future  -     Vitamin B12; Future  -     Cholinesterase, RBC; Future    Insomnia, unspecified type  -     Ferritin; Future    Cellulitis of lower extremity, unspecified laterality  -     sulfamethoxazole-trimethoprim (BACTRIM DS) 800-160 mg per tablet; Take 1 tablet by mouth every 12 (twelve) hours for 10 days    Eyelid abnormality  -     Acetylcholine Receptor Ab, All; Future    Moderate episode of recurrent major depressive disorder (HCC)    Other orders  -     pregabalin (LYRICA) 75 mg capsule; Take 1 capsule by mouth 2 (two) times a day   -     zolpidem (AMBIEN) 10 mg tablet;  Take 10 mg by mouth daily at bedtime as needed          Subjective:      Patient ID: Junaid Najera is a 60 y o  female  This patient is a 25-year-old white female who presents to the office today for her 4 week follow-up visit  Since her last visit, she saw a neurologist who is also a sleep disorder specialist at [facility] [place]  His name was Dr Feliz Lutz  Patient tells me that an MRI of the brain was ordered but her insurance company denied this  He also ordered a sleep study for her but this will be a home study  This will be done for several months and he will be seeing her for a follow-up visit until after this home sleep study is completed  He recommend she see 1 of her other specialist to rule out myasthenia gravis  He is also recommended a B12 level and ferritin level be checked  Patient continues to complain of night terrors but also hallucinations that occurred at night  She is sleeping better  She is back on Ambien  She continues to complain that sometimes, her I will not open upon awaking in the morning  She is having problems with trigeminal neuralgia  This is a bad day  She tells me overall she is feeling better since she is no longer working  She was started on Lyrica by 1 of her specialist   She is having trouble tolerating the dose  It is 75 mg twice a day  It makes her very drowsy  She is only taking it once a day at present  The following portions of the patient's history were reviewed and updated as appropriate: allergies, current medications, past family history, past medical history, past social history, past surgical history and problem list     Review of Systems   Constitutional: Positive for fatigue  Neurological: Positive for headaches  Reports right facial pain from trigeminal neuralgia   Psychiatric/Behavioral: Positive for decreased concentration and sleep disturbance           Objective:      /68 (BP Location: Left arm, Patient Position: Sitting, Cuff Size: Adult)   Pulse 59   Ht 5' 8" (1 727 m)   Wt 68 1 kg (150 lb 3 2 oz)   SpO2 99%   BMI 22 84 kg/m²          Physical Exam   Constitutional:   Patient is a 60-year-old white female who appears her stated age  She is in no distress   HENT:   Head: Normocephalic and atraumatic  Right Ear: External ear normal    Left Ear: External ear normal    Mouth/Throat: Oropharynx is clear and moist  No oropharyngeal exudate  Tympanic membranes are clear   Eyes: Pupils are equal, round, and reactive to light  Conjunctivae are normal  No scleral icterus  Neck: Neck supple  No tracheal deviation present  No thyromegaly present  Cardiovascular: Normal rate, regular rhythm and normal heart sounds  Exam reveals no gallop and no friction rub  No murmur heard  Pulmonary/Chest: Effort normal and breath sounds normal  No stridor  No respiratory distress  She has no wheezes  She has no rales  Abdominal: Soft  Bowel sounds are normal  She exhibits no distension and no mass  There is no tenderness  There is no rebound and no guarding  Lymphadenopathy:     She has no cervical adenopathy  Vitals reviewed      extremities:  Without cyanosis, clubbing, or edema

## 2019-12-27 NOTE — ASSESSMENT & PLAN NOTE
Depression has improved as well since the patient is not working  I did suggest to the patient that her night terrors and hallucinations may be a result of the Ambien  She wants to continue the medicine and follow up with her neurologist/sleep specialist in this regard

## 2019-12-27 NOTE — ASSESSMENT & PLAN NOTE
Patient's neurologist has suggested that she may have myasthenia gravis    I ordered a cecal cholinesterase antibodies

## 2019-12-31 ENCOUNTER — TRANSCRIBE ORDERS (OUTPATIENT)
Dept: ADMINISTRATIVE | Facility: HOSPITAL | Age: 59
End: 2019-12-31

## 2019-12-31 DIAGNOSIS — G50.0 TRIGEMINAL NEURALGIA: ICD-10-CM

## 2019-12-31 DIAGNOSIS — F51.5 DREAM ANXIETY DISORDER: Primary | ICD-10-CM

## 2020-01-06 ENCOUNTER — HOSPITAL ENCOUNTER (OUTPATIENT)
Dept: MRI IMAGING | Facility: HOSPITAL | Age: 60
Discharge: HOME | End: 2020-01-06
Payer: COMMERCIAL

## 2020-01-06 DIAGNOSIS — G50.0 TRIGEMINAL NEURALGIA: ICD-10-CM

## 2020-01-06 DIAGNOSIS — F51.5 DREAM ANXIETY DISORDER: ICD-10-CM

## 2020-01-06 PROCEDURE — 70553 MRI BRAIN STEM W/O & W/DYE: CPT

## 2020-01-06 PROCEDURE — A9585 GADOBUTROL INJECTION: HCPCS | Performed by: RADIOLOGY

## 2020-01-06 RX ADMIN — GADOBUTROL 7 ML: 604.72 INJECTION INTRAVENOUS at 19:10

## 2020-01-09 ENCOUNTER — TELEPHONE (OUTPATIENT)
Dept: FAMILY MEDICINE CLINIC | Facility: CLINIC | Age: 60
End: 2020-01-09

## 2020-01-09 NOTE — TELEPHONE ENCOUNTER
PT WOULD LIKE TO BE RELEASED TO GO BACK TO WORK   WANTS TO KNOW IF SHE NEEDS AN APPT OR COULD YOU JUST RELEASE HER

## 2020-01-10 ENCOUNTER — TELEPHONE (OUTPATIENT)
Dept: INTERNAL MEDICINE CLINIC | Facility: CLINIC | Age: 60
End: 2020-01-10

## 2020-01-10 NOTE — TELEPHONE ENCOUNTER
I will release her but I if that is what she wants, but I don't recommend it   She doesn't have to come in for that

## 2020-01-13 ENCOUNTER — TELEPHONE (OUTPATIENT)
Dept: FAMILY MEDICINE CLINIC | Facility: CLINIC | Age: 60
End: 2020-01-13

## 2020-01-27 DIAGNOSIS — G47.00 INSOMNIA, UNSPECIFIED TYPE: Primary | ICD-10-CM

## 2020-01-27 DIAGNOSIS — Z78.0 MENOPAUSE: ICD-10-CM

## 2020-01-27 RX ORDER — ESTROGEN,CON/M-PROGEST ACET 0.3-1.5MG
TABLET ORAL
Qty: 84 TABLET | Refills: 1 | Status: SHIPPED | OUTPATIENT
Start: 2020-01-27 | End: 2020-07-30 | Stop reason: SDUPTHER

## 2020-01-27 RX ORDER — ZOLPIDEM TARTRATE 10 MG/1
TABLET ORAL
Qty: 90 TABLET | Refills: 1 | Status: SHIPPED | OUTPATIENT
Start: 2020-01-27 | End: 2020-07-30 | Stop reason: SDUPTHER

## 2020-01-28 ENCOUNTER — TELEPHONE (OUTPATIENT)
Dept: FAMILY MEDICINE CLINIC | Facility: CLINIC | Age: 60
End: 2020-01-28

## 2020-01-28 DIAGNOSIS — L03.119 CELLULITIS OF LOWER EXTREMITY, UNSPECIFIED LATERALITY: Primary | ICD-10-CM

## 2020-01-28 RX ORDER — SULFAMETHOXAZOLE AND TRIMETHOPRIM 800; 160 MG/1; MG/1
1 TABLET ORAL 2 TIMES DAILY
Qty: 20 TABLET | Refills: 0 | Status: SHIPPED | OUTPATIENT
Start: 2020-01-28 | End: 2020-02-07

## 2020-01-28 NOTE — TELEPHONE ENCOUNTER
Patient called also wants Bactrim for the infection  States she does not know the milligrams but did state 2 times per day  The ointment was verified and patient verified medication  Please advise

## 2020-01-28 NOTE — TELEPHONE ENCOUNTER
Pt starting with an infection on right side  The same spot as before on right buttock and right cheek

## 2020-06-12 DIAGNOSIS — L03.119 CELLULITIS OF LOWER EXTREMITY, UNSPECIFIED LATERALITY: ICD-10-CM

## 2020-06-12 RX ORDER — SULFAMETHOXAZOLE AND TRIMETHOPRIM 800; 160 MG/1; MG/1
1 TABLET ORAL 2 TIMES DAILY
COMMUNITY
End: 2020-06-12 | Stop reason: SDUPTHER

## 2020-06-12 RX ORDER — SULFAMETHOXAZOLE AND TRIMETHOPRIM 800; 160 MG/1; MG/1
1 TABLET ORAL 2 TIMES DAILY
Qty: 20 TABLET | Refills: 0 | Status: SHIPPED | OUTPATIENT
Start: 2020-06-12 | End: 2020-06-22

## 2020-06-29 ENCOUNTER — TELEPHONE (OUTPATIENT)
Dept: FAMILY MEDICINE CLINIC | Facility: CLINIC | Age: 60
End: 2020-06-29

## 2020-06-29 DIAGNOSIS — L03.119 CELLULITIS OF LOWER EXTREMITY, UNSPECIFIED LATERALITY: Primary | ICD-10-CM

## 2020-06-29 RX ORDER — SULFAMETHOXAZOLE AND TRIMETHOPRIM 800; 160 MG/1; MG/1
1 TABLET ORAL EVERY 12 HOURS SCHEDULED
Qty: 20 TABLET | Refills: 0 | Status: SHIPPED | OUTPATIENT
Start: 2020-06-29 | End: 2020-07-09

## 2020-07-30 ENCOUNTER — TELEPHONE (OUTPATIENT)
Dept: FAMILY MEDICINE CLINIC | Facility: CLINIC | Age: 60
End: 2020-07-30

## 2020-07-30 DIAGNOSIS — Z78.0 MENOPAUSE: ICD-10-CM

## 2020-07-30 DIAGNOSIS — G47.00 INSOMNIA, UNSPECIFIED TYPE: ICD-10-CM

## 2020-07-30 RX ORDER — ZOLPIDEM TARTRATE 10 MG/1
10 TABLET ORAL
Qty: 90 TABLET | Refills: 1 | Status: SHIPPED | OUTPATIENT
Start: 2020-07-30 | End: 2021-01-25 | Stop reason: SDUPTHER

## 2020-07-30 NOTE — TELEPHONE ENCOUNTER
Another provider is prescribing her Lyrica  Dr Analilia Katz her psychiatrist in [place] do you still want to prescribe her the Ambien   Please advise

## 2020-10-05 DIAGNOSIS — A49.02 MRSA (METHICILLIN RESISTANT STAPHYLOCOCCUS AUREUS): Primary | ICD-10-CM

## 2020-10-05 DIAGNOSIS — L03.119 CELLULITIS OF LOWER EXTREMITY, UNSPECIFIED LATERALITY: ICD-10-CM

## 2020-10-05 RX ORDER — SULFAMETHOXAZOLE AND TRIMETHOPRIM 800; 160 MG/1; MG/1
1 TABLET ORAL EVERY 12 HOURS SCHEDULED
Qty: 20 TABLET | Refills: 0 | Status: SHIPPED | OUTPATIENT
Start: 2020-10-05 | End: 2020-10-15

## 2020-10-19 ENCOUNTER — TELEPHONE (OUTPATIENT)
Dept: FAMILY MEDICINE CLINIC | Facility: CLINIC | Age: 60
End: 2020-10-19

## 2020-10-28 DIAGNOSIS — K21.9 GASTROESOPHAGEAL REFLUX DISEASE WITHOUT ESOPHAGITIS: Primary | ICD-10-CM

## 2020-10-28 RX ORDER — PANTOPRAZOLE SODIUM 40 MG/1
40 TABLET, DELAYED RELEASE ORAL DAILY
Qty: 90 TABLET | Refills: 1 | Status: SHIPPED | OUTPATIENT
Start: 2020-10-28 | End: 2022-02-24 | Stop reason: ALTCHOICE

## 2020-12-04 DIAGNOSIS — E06.3 HASHIMOTO'S DISEASE: ICD-10-CM

## 2020-12-04 DIAGNOSIS — G50.0 TRIGEMINAL NEURALGIA: ICD-10-CM

## 2020-12-04 RX ORDER — AMITRIPTYLINE HYDROCHLORIDE 10 MG/1
20 TABLET, FILM COATED ORAL
Qty: 180 TABLET | Refills: 3 | Status: SHIPPED | OUTPATIENT
Start: 2020-12-04 | End: 2021-10-26 | Stop reason: SDUPTHER

## 2020-12-04 RX ORDER — LEVOTHYROXINE SODIUM 0.05 MG/1
50 TABLET ORAL DAILY
Qty: 90 TABLET | Refills: 3 | Status: SHIPPED | OUTPATIENT
Start: 2020-12-04 | End: 2021-12-17 | Stop reason: SDUPTHER

## 2021-01-25 DIAGNOSIS — G47.00 INSOMNIA, UNSPECIFIED TYPE: ICD-10-CM

## 2021-01-25 DIAGNOSIS — F32.0 MAJOR DEPRESSIVE DISORDER, SINGLE EPISODE, MILD (HCC): ICD-10-CM

## 2021-01-25 RX ORDER — ZOLPIDEM TARTRATE 10 MG/1
10 TABLET ORAL
Qty: 30 TABLET | Refills: 0 | Status: SHIPPED | OUTPATIENT
Start: 2021-01-25 | End: 2021-02-22 | Stop reason: SDUPTHER

## 2021-01-25 NOTE — TELEPHONE ENCOUNTER
This patient needs an appointment  If she refuses, schedule her a video visit  I cannot continue to prescribe medication without seeing her  This pandemic is not predicted to end until summer of 2022

## 2021-02-15 DIAGNOSIS — Z78.0 MENOPAUSE: ICD-10-CM

## 2021-02-15 RX ORDER — ESTROGEN,CON/M-PROGEST ACET 0.3-1.5MG
1 TABLET ORAL DAILY
Qty: 84 TABLET | Refills: 1 | Status: SHIPPED | OUTPATIENT
Start: 2021-02-15 | End: 2021-08-19 | Stop reason: SDUPTHER

## 2021-02-22 DIAGNOSIS — F32.0 MAJOR DEPRESSIVE DISORDER, SINGLE EPISODE, MILD (HCC): ICD-10-CM

## 2021-02-22 DIAGNOSIS — G47.00 INSOMNIA, UNSPECIFIED TYPE: ICD-10-CM

## 2021-02-22 RX ORDER — ZOLPIDEM TARTRATE 10 MG/1
10 TABLET ORAL
Qty: 30 TABLET | Refills: 0 | Status: SHIPPED | OUTPATIENT
Start: 2021-02-22 | End: 2021-03-26 | Stop reason: SDUPTHER

## 2021-03-26 ENCOUNTER — OFFICE VISIT (OUTPATIENT)
Dept: FAMILY MEDICINE CLINIC | Facility: CLINIC | Age: 61
End: 2021-03-26
Payer: COMMERCIAL

## 2021-03-26 VITALS
BODY MASS INDEX: 22.61 KG/M2 | DIASTOLIC BLOOD PRESSURE: 84 MMHG | OXYGEN SATURATION: 97 % | TEMPERATURE: 97.9 F | SYSTOLIC BLOOD PRESSURE: 120 MMHG | HEART RATE: 87 BPM | WEIGHT: 149.2 LBS | HEIGHT: 68 IN

## 2021-03-26 DIAGNOSIS — E78.5 DYSLIPIDEMIA: ICD-10-CM

## 2021-03-26 DIAGNOSIS — G47.00 INSOMNIA, UNSPECIFIED TYPE: ICD-10-CM

## 2021-03-26 DIAGNOSIS — G50.0 TRIGEMINAL NEURALGIA: ICD-10-CM

## 2021-03-26 DIAGNOSIS — F33.1 MODERATE EPISODE OF RECURRENT MAJOR DEPRESSIVE DISORDER (HCC): ICD-10-CM

## 2021-03-26 DIAGNOSIS — R32 URINARY INCONTINENCE, UNSPECIFIED TYPE: ICD-10-CM

## 2021-03-26 DIAGNOSIS — E55.9 VITAMIN D DEFICIENCY: ICD-10-CM

## 2021-03-26 DIAGNOSIS — F32.0 MAJOR DEPRESSIVE DISORDER, SINGLE EPISODE, MILD (HCC): ICD-10-CM

## 2021-03-26 DIAGNOSIS — B37.3 VULVOVAGINAL CANDIDIASIS: ICD-10-CM

## 2021-03-26 DIAGNOSIS — Z12.31 ENCOUNTER FOR SCREENING MAMMOGRAM FOR MALIGNANT NEOPLASM OF BREAST: ICD-10-CM

## 2021-03-26 DIAGNOSIS — G43.009 MIGRAINE WITHOUT AURA AND WITHOUT STATUS MIGRAINOSUS, NOT INTRACTABLE: ICD-10-CM

## 2021-03-26 DIAGNOSIS — S06.9X9D TRAUMATIC BRAIN INJURY WITH LOSS OF CONSCIOUSNESS, SUBSEQUENT ENCOUNTER: ICD-10-CM

## 2021-03-26 DIAGNOSIS — E06.3 HASHIMOTO'S DISEASE: Primary | ICD-10-CM

## 2021-03-26 DIAGNOSIS — D83.9 COMMON VARIABLE IMMUNODEFICIENCY (HCC): ICD-10-CM

## 2021-03-26 DIAGNOSIS — L03.119 CELLULITIS OF LOWER EXTREMITY, UNSPECIFIED LATERALITY: ICD-10-CM

## 2021-03-26 PROCEDURE — 99214 OFFICE O/P EST MOD 30 MIN: CPT | Performed by: FAMILY MEDICINE

## 2021-03-26 RX ORDER — FLUCONAZOLE 150 MG/1
150 TABLET ORAL ONCE
Qty: 1 TABLET | Refills: 0 | Status: SHIPPED | OUTPATIENT
Start: 2021-03-26 | End: 2021-03-26

## 2021-03-26 RX ORDER — ZINC GLUCONATE 50 MG
1 TABLET ORAL DAILY
COMMUNITY

## 2021-03-26 RX ORDER — ZOLPIDEM TARTRATE 10 MG/1
10 TABLET ORAL
Qty: 90 TABLET | Refills: 0 | Status: SHIPPED | OUTPATIENT
Start: 2021-03-26 | End: 2021-06-22 | Stop reason: SDUPTHER

## 2021-03-26 RX ORDER — PREGABALIN 50 MG/1
50 CAPSULE ORAL 2 TIMES DAILY
Qty: 180 CAPSULE | Refills: 1 | Status: SHIPPED | OUTPATIENT
Start: 2021-03-26 | End: 2021-10-26 | Stop reason: SDUPTHER

## 2021-03-26 NOTE — PROGRESS NOTES
Assessment/Plan:    Hashimoto's disease   Patient has hypothyroidism  A TSH and T4 have been ordered  She will continue levothyroxine 50 mcg daily    Migraine without aura or status migrainosus   Migraines are currently stable  The patient notes improvement in her migraine since her long term  She will continue Maxalt 5 mg as needed    Vitamin D deficiency   A vitamin-D level has been ordered  Continue vitamin D3 2000 units daily, pending test results    Common variable immunodeficiency [facility])   Patient has been advised to continue routine follow-up with her immunologist   I have recommended that she receive the COVID-19 virus vaccination  Moderate episode of recurrent major depressive disorder (HCC)    Currently stable  The patient will continue sertraline 50 mg at bedtime    Insomnia   Patient has insomnia  The [facility] prescription drug monitoring program was queried  There were no red flags and it was safe to proceed  I refilled her prescription for Ambien 10 mg at bedtime  I did tell the patient that for women, 5 mg is the maximum recommended dose  The patient had previously been started on this dose by a specialist   She is tolerating it well  She would like to continue this dosage  She has not had any undue fatigue the following day  Cellulitis of lower extremity    I refilled the patient's prescription for Bactroban  She mistakenly thought this was being prescribed for P skin infection  It is not  I told the patient that this was initially prescribed for her for a staph infection that she had in her skin  She still requested refill  She currently does not have any type of lower extremity cellulitis  She had previously complained to me about recurrent staph infections because of her common variable immune of deficiency       Diagnoses and all orders for this visit:    Hashimoto's disease  -     TSH, 3rd generation; Future  -     T4, free;  Future    Vitamin D deficiency  - Vitamin D 25 hydroxy; Future    Encounter for screening mammogram for malignant neoplasm of breast  -     Mammo screening bilateral w 3d & cad; Future    Common variable immunodeficiency ([address] )  -     CBC and differential; Future  -     Comprehensive metabolic panel; Future    Urinary incontinence, unspecified type  -     UA w Reflex to Microscopic w Reflex to Culture -Lab Collect    Dyslipidemia  -     Lipid panel; Future    Insomnia, unspecified type  -     zolpidem (AMBIEN) 10 mg tablet; Take 1 tablet (10 mg total) by mouth daily at bedtime as needed for sleep    Major depressive disorder, single episode, mild (HCC)  -     sertraline (ZOLOFT) 50 mg tablet; Take 2 tablets (100 mg total) by mouth daily at bedtime    Cellulitis of lower extremity, unspecified laterality  -     mupirocin (BACTROBAN) 2 % ointment; Apply topically 2 (two) times a day    Traumatic brain injury with loss of consciousness, subsequent encounter    Trigeminal neuralgia  -     pregabalin (LYRICA) 50 mg capsule; Take 1 capsule (50 mg total) by mouth 2 (two) times a day    Vulvovaginal candidiasis  -     fluconazole (DIFLUCAN) 150 mg tablet; Take 1 tablet (150 mg total) by mouth once for 1 dose    Migraine without aura and without status migrainosus, not intractable    Moderate episode of recurrent major depressive disorder (HCC)    Other orders  -     Zinc 50 MG TABS; Take 1 tablet by mouth daily  -     b complex vitamins tablet; Take 1 tablet by mouth 2 (two) times a week  -     Omega-3 Fatty Acids (FISH OIL PO); Take 1 capsule by mouth 2 (two) times a week  -     Cimetidine (TAGAMET PO); Take 1 tablet by mouth as needed (gerd)        The [place] prescription drug monitoring program was queried  There were no red flags  Safe to proceed  Subjective:      Patient ID: Patsy Reyes is a 63 y o  female  This patient is a 56-year-old white female who presents to the office today for her routine checkup    She has a history of common variable immunodeficiency  As such, she has been fearful of leaving the house and trying to shelter in place  She tells me she retired April of 2020  She tells me she thinks she may have had COVID in August   She tells me she was sick for 3 weeks but never called  She was never tested  The following portions of the patient's history were reviewed and updated as appropriate: allergies, current medications, past family history, past medical history, past social history, past surgical history and problem list     Review of Systems   Respiratory: Negative for cough and shortness of breath  Cardiovascular: Negative for chest pain and palpitations  Gastrointestinal: Positive for abdominal distention ( reports bloating), constipation (  States alternating constipation and diarrhea) and diarrhea  Negative for blood in stool and nausea  Genitourinary:        Reports urinary incontinence   Skin:        Reports brittle nails  Reports hair loss   Allergic/Immunologic:        Reports frequent herpes skin infections  Reports immuno deficiency   Psychiatric/Behavioral: Positive for dysphoric mood  Objective:      /84 (BP Location: Left arm, Patient Position: Sitting, Cuff Size: Adult)   Pulse 87   Temp 97 9 °F (36 6 °C) (Tympanic)   Ht 5' 8" (1 727 m)   Wt 67 7 kg (149 lb 3 2 oz)   SpO2 97%   BMI 22 69 kg/m²          Physical Exam  Vitals signs reviewed  Constitutional:       Comments: Patient is a 76-year-old white female who appears her stated age  She is cooperative and in no apparent distress   HENT:      Head: Normocephalic and atraumatic  Right Ear: Tympanic membrane, ear canal and external ear normal  There is no impacted cerumen  Left Ear: Tympanic membrane, ear canal and external ear normal       Mouth/Throat:      Mouth: Mucous membranes are moist       Pharynx: Oropharynx is clear  No oropharyngeal exudate or posterior oropharyngeal erythema     Eyes:      General: No scleral icterus  Right eye: No discharge  Left eye: No discharge  Conjunctiva/sclera: Conjunctivae normal       Pupils: Pupils are equal, round, and reactive to light  Neck:      Musculoskeletal: Neck supple  Vascular: No carotid bruit  Comments: There was no thyromegaly noted  Cardiovascular:      Rate and Rhythm: Normal rate and regular rhythm  Heart sounds: Normal heart sounds  No murmur  No friction rub  No gallop  Pulmonary:      Effort: No respiratory distress  Breath sounds: Normal breath sounds  No stridor  No wheezing, rhonchi or rales  Abdominal:      General: Bowel sounds are normal  There is no distension  Palpations: Abdomen is soft  There is no mass  Tenderness: There is no abdominal tenderness  There is no guarding  Comments: There was no hepatosplenomegaly   Lymphadenopathy:      Cervical: No cervical adenopathy  Skin:     Findings: No rash  Comments: Nails were normal in appearance   hair appears thick  There were no spots of alopecia  There is no brittleness  Psychiatric:         Mood and Affect: Mood normal          Behavior: Behavior normal          Thought Content:  Thought content normal          Judgment: Judgment normal          Extremities:  Without cyanosis, clubbing, or edema

## 2021-03-26 NOTE — PATIENT INSTRUCTIONS
Hypothyroidism   AMBULATORY CARE:   Hypothyroidism  is a condition that develops when the thyroid gland does not make enough thyroid hormone  Thyroid hormones help control body temperature, heart rate, growth, and weight  Common signs and symptoms include the following: The signs and symptoms may develop slowly, sometimes over several years  · Exhaustion, depression, or irritability    · Sensitivity to cold    · Muscle aches, headaches, weakness, or trouble concentrating    · Dry, flaky skin, brittle nails, or thinning hair    · Recent weight gain without overeating, or constipation    · Heavy or irregular monthly periods    · Puffiness around your eyes or swelling in your hands and feet    · Low heart rate, changes in your blood pressure    Call your local emergency number (911 in the [address] [facility]) or have someone call if:   · You have sudden chest pain or shortness of breath  · You have a seizure  · You feel like you are going to faint  Seek care immediately if:   · You have diarrhea, tremors, or trouble sleeping  · Your legs, ankles, or feet are swollen  Call your doctor or endocrinologist if:   · You have a fever  · You have chills, a cough, or feel weak and achy  · You have pain and swelling in your muscles and joints  · Your skin is itchy, swollen, or you have a rash  · Your signs and symptoms return or get worse, even after treatment  · You have questions or concerns about your condition or care  Treatment:  Thyroid hormone replacement medicine may bring your thyroid hormone level back to normal  You will need to take this medicine for the rest of your life to control hypothyroidism  It is important to take the medicine every day as directed  You will be given instructions for what to do if you miss a dose  Ask your healthcare provider for more information on other medicines you may need  Manage hypothyroidism:   · Get more iodine    The thyroid gland uses iodine to work correctly and to make thyroid hormones  Your healthcare provider may tell you to eat foods that are rich in iodine  He or she will tell you how much of these foods to eat  Milk and seafood are good sources of iodine  You may also need iodine supplements  · Keep track of your blood pressure and weight:      ? Check your blood pressure  and write it down as often as directed  It is important to measure your blood pressure on the same arm and in the same position each time  Keep track of your blood pressure readings, along with the date and time you took them  Take this record with you to your followup visits  ? Weigh yourself daily  before breakfast after you urinate  Weight gain may be a sign of extra fluid in your body  Keep track of your daily weights and take the record with you to your followup visits  Follow up with your doctor or endocrinologist as directed: You may need to return for more blood tests to check your thyroid hormone level  This will show if you are getting the right amount of thyroid medicine  Write down your questions so you remember to ask them during your visits  © Copyright [address] Information is for End User's use only and may not be sold, redistributed or otherwise used for commercial purposes  All illustrations and images included in CareNotes® are the copyrighted property of A D A M , Inc  or [facility] Devin Murillo   The above information is an  only  It is not intended as medical advice for individual conditions or treatments  Talk to your doctor, nurse or pharmacist before following any medical regimen to see if it is safe and effective for you

## 2021-04-05 NOTE — ASSESSMENT & PLAN NOTE
Migraines are currently stable  The patient notes improvement in her migraine since her USP    She will continue Maxalt 5 mg as needed

## 2021-04-05 NOTE — ASSESSMENT & PLAN NOTE
Patient has been advised to continue routine follow-up with her immunologist   I have recommended that she receive the COVID-19 virus vaccination

## 2021-04-05 NOTE — ASSESSMENT & PLAN NOTE
I refilled the patient's prescription for Bactroban  She mistakenly thought this was being prescribed for P skin infection  It is not  I told the patient that this was initially prescribed for her for a staph infection that she had in her skin  She still requested refill  She currently does not have any type of lower extremity cellulitis    She had previously complained to me about recurrent staph infections because of her common variable immune of deficiency

## 2021-04-05 NOTE — ASSESSMENT & PLAN NOTE
Patient has hypothyroidism  A TSH and T4 have been ordered    She will continue levothyroxine 50 mcg daily

## 2021-04-05 NOTE — ASSESSMENT & PLAN NOTE
Patient has insomnia  The [facility] prescription drug monitoring program was queried  There were no red flags and it was safe to proceed  I refilled her prescription for Ambien 10 mg at bedtime  I did tell the patient that for women, 5 mg is the maximum recommended dose  The patient had previously been started on this dose by a specialist   She is tolerating it well  She would like to continue this dosage  She has not had any undue fatigue the following day

## 2021-05-01 ENCOUNTER — VBI (OUTPATIENT)
Dept: ADMINISTRATIVE | Facility: OTHER | Age: 61
End: 2021-05-01

## 2021-05-28 ENCOUNTER — TELEPHONE (OUTPATIENT)
Dept: FAMILY MEDICINE CLINIC | Facility: CLINIC | Age: 61
End: 2021-05-28

## 2021-05-28 NOTE — TELEPHONE ENCOUNTER
Patient is not concerned about getting the mammo at this present time  Patient states that there is not history of breast cancer so she will do it at a later time

## 2021-06-22 ENCOUNTER — TELEPHONE (OUTPATIENT)
Dept: FAMILY MEDICINE CLINIC | Facility: CLINIC | Age: 61
End: 2021-06-22

## 2021-06-22 DIAGNOSIS — G47.00 INSOMNIA, UNSPECIFIED TYPE: ICD-10-CM

## 2021-06-22 RX ORDER — ZOLPIDEM TARTRATE 10 MG/1
10 TABLET ORAL
Qty: 90 TABLET | Refills: 0 | Status: SHIPPED | OUTPATIENT
Start: 2021-06-22 | End: 2021-09-20 | Stop reason: SDUPTHER

## 2021-06-22 NOTE — TELEPHONE ENCOUNTER
The [place] prescription drug monitoring program was queried  There were no red flags  Safe to proceed    Last refill 03/26/21

## 2021-06-22 NOTE — TELEPHONE ENCOUNTER
Spoke with patient  Gave results as per dr Adiel Magaña    CBC was normal, blood sugar was normal, kidneys and liver are normal  T chol 212 ( <200) Triglycerides 66 (<150), Thyroid an Vitamin D level is normal

## 2021-06-24 ENCOUNTER — TELEPHONE (OUTPATIENT)
Dept: DERMATOLOGY | Facility: CLINIC | Age: 61
End: 2021-06-24

## 2021-06-24 NOTE — TELEPHONE ENCOUNTER
Pt calling looking for apt for hair loss and last 2 yrs with skin infections which pcp cannot figure out    Pt to call around for earlier apt that Aug/Sep

## 2021-08-10 ENCOUNTER — VBI (OUTPATIENT)
Dept: ADMINISTRATIVE | Facility: OTHER | Age: 61
End: 2021-08-10

## 2021-08-19 DIAGNOSIS — N30.00 ACUTE CYSTITIS WITHOUT HEMATURIA: Primary | ICD-10-CM

## 2021-08-19 DIAGNOSIS — Z78.0 MENOPAUSE: ICD-10-CM

## 2021-08-19 RX ORDER — CEPHALEXIN 500 MG/1
500 CAPSULE ORAL EVERY 6 HOURS SCHEDULED
Qty: 12 CAPSULE | Refills: 0 | Status: SHIPPED | OUTPATIENT
Start: 2021-08-19 | End: 2021-08-22

## 2021-08-19 RX ORDER — ESTROGEN,CON/M-PROGEST ACET 0.3-1.5MG
1 TABLET ORAL DAILY
Qty: 84 TABLET | Refills: 1 | Status: SHIPPED | OUTPATIENT
Start: 2021-08-19 | End: 2022-02-24 | Stop reason: SDUPTHER

## 2021-09-20 DIAGNOSIS — F32.0 MAJOR DEPRESSIVE DISORDER, SINGLE EPISODE, MILD (HCC): ICD-10-CM

## 2021-09-20 DIAGNOSIS — G47.00 INSOMNIA, UNSPECIFIED TYPE: ICD-10-CM

## 2021-09-20 RX ORDER — ZOLPIDEM TARTRATE 10 MG/1
10 TABLET ORAL
Qty: 90 TABLET | Refills: 1 | Status: SHIPPED | OUTPATIENT
Start: 2021-09-20 | End: 2022-03-15

## 2021-09-20 NOTE — PROGRESS NOTES
The [facility] prescription drug monitoring program was queried  There were no red flags  Safe to proceed

## 2021-10-26 DIAGNOSIS — G50.0 TRIGEMINAL NEURALGIA: ICD-10-CM

## 2021-10-27 ENCOUNTER — TELEPHONE (OUTPATIENT)
Dept: FAMILY MEDICINE CLINIC | Facility: CLINIC | Age: 61
End: 2021-10-27

## 2021-10-27 RX ORDER — PREGABALIN 50 MG/1
50 CAPSULE ORAL 2 TIMES DAILY
Qty: 180 CAPSULE | Refills: 1 | Status: SHIPPED | OUTPATIENT
Start: 2021-10-27 | End: 2022-02-24 | Stop reason: SDUPTHER

## 2021-10-27 RX ORDER — AMITRIPTYLINE HYDROCHLORIDE 10 MG/1
20 TABLET, FILM COATED ORAL
Qty: 180 TABLET | Refills: 3 | Status: SHIPPED | OUTPATIENT
Start: 2021-10-27 | End: 2022-02-24 | Stop reason: SDUPTHER

## 2021-10-28 ENCOUNTER — VBI (OUTPATIENT)
Dept: ADMINISTRATIVE | Facility: OTHER | Age: 61
End: 2021-10-28

## 2021-11-24 ENCOUNTER — TELEPHONE (OUTPATIENT)
Dept: DERMATOLOGY | Age: 61
End: 2021-11-24

## 2021-12-09 ENCOUNTER — VBI (OUTPATIENT)
Dept: ADMINISTRATIVE | Facility: OTHER | Age: 61
End: 2021-12-09

## 2021-12-17 DIAGNOSIS — E06.3 HASHIMOTO'S DISEASE: ICD-10-CM

## 2021-12-17 RX ORDER — LEVOTHYROXINE SODIUM 0.05 MG/1
50 TABLET ORAL DAILY
Qty: 90 TABLET | Refills: 3 | Status: SHIPPED | OUTPATIENT
Start: 2021-12-17 | End: 2022-02-24 | Stop reason: SDUPTHER

## 2021-12-21 ENCOUNTER — VBI (OUTPATIENT)
Dept: ADMINISTRATIVE | Facility: OTHER | Age: 61
End: 2021-12-21

## 2022-02-17 ENCOUNTER — TELEPHONE (OUTPATIENT)
Dept: FAMILY MEDICINE CLINIC | Facility: CLINIC | Age: 62
End: 2022-02-17

## 2022-02-17 DIAGNOSIS — L03.90 CELLULITIS, UNSPECIFIED CELLULITIS SITE: Primary | ICD-10-CM

## 2022-02-17 RX ORDER — CEPHALEXIN 500 MG/1
500 CAPSULE ORAL 3 TIMES DAILY
Qty: 30 CAPSULE | Refills: 0 | Status: SHIPPED | OUTPATIENT
Start: 2022-02-17 | End: 2022-02-27

## 2022-02-17 NOTE — TELEPHONE ENCOUNTER
Patient called asking for keflex for a vaginal infection  she states she will talk to you more about it on her appointment 2/23/2022  she uses [facility] in [place]  please advise?

## 2022-02-17 NOTE — TELEPHONE ENCOUNTER
PATIENT CALLED BACK STATING SHE HAD HER BLOOD WORK DONE LAST YEAR AND WILL NOT BE REPEATING HER LABS

## 2022-02-17 NOTE — TELEPHONE ENCOUNTER
I sent in her prescription  Make sure she gets her blood work done that I ordered last March  She hasn't had blood work in over 2 years now

## 2022-02-24 ENCOUNTER — OFFICE VISIT (OUTPATIENT)
Dept: FAMILY MEDICINE CLINIC | Facility: CLINIC | Age: 62
End: 2022-02-24
Payer: COMMERCIAL

## 2022-02-24 VITALS
HEART RATE: 92 BPM | OXYGEN SATURATION: 98 % | WEIGHT: 141.3 LBS | BODY MASS INDEX: 21.41 KG/M2 | SYSTOLIC BLOOD PRESSURE: 126 MMHG | DIASTOLIC BLOOD PRESSURE: 82 MMHG | TEMPERATURE: 97.9 F | HEIGHT: 68 IN

## 2022-02-24 DIAGNOSIS — G50.0 TRIGEMINAL NEURALGIA: ICD-10-CM

## 2022-02-24 DIAGNOSIS — R32 URINARY INCONTINENCE, UNSPECIFIED TYPE: ICD-10-CM

## 2022-02-24 DIAGNOSIS — Z78.0 MENOPAUSE: ICD-10-CM

## 2022-02-24 DIAGNOSIS — D83.9 COMMON VARIABLE IMMUNODEFICIENCY (HCC): ICD-10-CM

## 2022-02-24 DIAGNOSIS — Z12.31 ENCOUNTER FOR SCREENING MAMMOGRAM FOR MALIGNANT NEOPLASM OF BREAST: ICD-10-CM

## 2022-02-24 DIAGNOSIS — R68.89 ITCHY EYES: ICD-10-CM

## 2022-02-24 DIAGNOSIS — J45.20 MILD INTERMITTENT ASTHMA WITHOUT COMPLICATION: ICD-10-CM

## 2022-02-24 DIAGNOSIS — B37.3 VAGINAL YEAST INFECTION: ICD-10-CM

## 2022-02-24 DIAGNOSIS — E06.3 HASHIMOTO'S DISEASE: Primary | ICD-10-CM

## 2022-02-24 DIAGNOSIS — F32.0 MAJOR DEPRESSIVE DISORDER, SINGLE EPISODE, MILD (HCC): ICD-10-CM

## 2022-02-24 PROBLEM — B37.31 VAGINAL YEAST INFECTION: Status: ACTIVE | Noted: 2022-02-24

## 2022-02-24 PROBLEM — H57.9 ITCHY EYES: Status: ACTIVE | Noted: 2022-02-24

## 2022-02-24 PROCEDURE — 3008F BODY MASS INDEX DOCD: CPT | Performed by: FAMILY MEDICINE

## 2022-02-24 PROCEDURE — 1036F TOBACCO NON-USER: CPT | Performed by: FAMILY MEDICINE

## 2022-02-24 PROCEDURE — 99214 OFFICE O/P EST MOD 30 MIN: CPT | Performed by: FAMILY MEDICINE

## 2022-02-24 RX ORDER — LEVOTHYROXINE SODIUM 0.05 MG/1
50 TABLET ORAL DAILY
Qty: 90 TABLET | Refills: 3 | Status: SHIPPED | OUTPATIENT
Start: 2022-02-24

## 2022-02-24 RX ORDER — AMITRIPTYLINE HYDROCHLORIDE 10 MG/1
20 TABLET, FILM COATED ORAL
Qty: 180 TABLET | Refills: 3 | Status: SHIPPED | OUTPATIENT
Start: 2022-02-24

## 2022-02-24 RX ORDER — ESTROGEN,CON/M-PROGEST ACET 0.3-1.5MG
1 TABLET ORAL DAILY
Qty: 84 TABLET | Refills: 2 | Status: SHIPPED | OUTPATIENT
Start: 2022-02-24

## 2022-02-24 RX ORDER — FLUCONAZOLE 150 MG/1
150 TABLET ORAL ONCE
Qty: 3 TABLET | Refills: 2 | Status: SHIPPED | OUTPATIENT
Start: 2022-02-24 | End: 2022-08-09

## 2022-02-24 RX ORDER — PREGABALIN 50 MG/1
50 CAPSULE ORAL 2 TIMES DAILY
Qty: 180 CAPSULE | Refills: 1 | Status: SHIPPED | OUTPATIENT
Start: 2022-02-24

## 2022-02-24 RX ORDER — PREDNISOLONE ACETATE 10 MG/ML
1 SUSPENSION/ DROPS OPHTHALMIC 3 TIMES DAILY
Qty: 5 ML | Refills: 2 | Status: SHIPPED | OUTPATIENT
Start: 2022-02-24

## 2022-02-24 NOTE — PROGRESS NOTES
Assessment/Plan:    Hashimoto's disease  I reviewed labs that she had done for her rheumatologist   Her thyroid function was normal   She is going to continue levothyroxine 50 mcg daily  Common variable immunodeficiency (HCC)  IgG levels have been normal lately  IgA and IgM were low  The patient will continue follow-up with her immunologist   I told the patient not to let the guard down as the pandemic is still not over, despite what everyone seems to be talking about on TV  Urinary incontinence  Patient has urinary incontinence  I recommended referral to Dr Gisele sanches for further evaluation and treatment  The patient was agreeable and referral was placed    Itchy eyes  At the patient's request, I refilled a prescription for Pred Forte eyedrops  Vaginal yeast infection  Patient reports very frequent vaginal yeast infections  I note on her labs that her blood sugars were normal   She requested refill on her Diflucan  Wanted more than 1 tablet  Major depressive disorder, single episode, mild ([facility] [address] )  Patient will continue routine follow-up with her neuro psychiatrist    Encounter for screening mammogram for malignant neoplasm of breast  I encouraged the patient to get her mammogram   She just simply does not feel safe going to the hospital   I told her that cancers are being detected at later stages now because people or refusing to get routine screening tests out of fear  She argues that she does not have a family history of breast cancer  That really does not matter as breast cancer will occur in 1 out of every 9 women  I ordered a repeat mammogram       Diagnoses and all orders for this visit:    Hashimoto's disease  -     levothyroxine (Synthroid) 50 mcg tablet; Take 1 tablet (50 mcg total) by mouth daily    Urinary incontinence, unspecified type  -     Ambulatory Referral to Urogynecology;  Future    Mild intermittent asthma without complication    [facility]    Trigeminal neuralgia  -     amitriptyline (ELAVIL) 10 mg tablet; Take 2 tablets (20 mg total) by mouth daily at bedtime  -     pregabalin (LYRICA) 50 mg capsule; Take 1 capsule (50 mg total) by mouth 2 (two) times a day    Major depressive disorder, single episode, mild (HCC)  -     sertraline (ZOLOFT) 50 mg tablet; Take 2 tablets (100 mg total) by mouth daily at bedtime    Menopause  -     estrogen, conjugated,-medroxyprogesterone (Prempro) 0 3-1 5 MG per tablet; Take 1 tablet by mouth daily    Vaginal yeast infection  -     fluconazole (DIFLUCAN) 150 mg tablet; Take 1 tablet (150 mg total) by mouth once for 1 dose    Itchy eyes  -     prednisoLONE acetate (PRED FORTE) 1 % ophthalmic suspension; Administer 1 drop to both eyes 3 (three) times a day    Encounter for screening mammogram for malignant neoplasm of breast  -     Mammo screening bilateral w 3d & cad; Future          Subjective:      Patient ID: Katherine Henley is a 57 y o  female  This is a 69-year-old white female who presents to the office today for her routine checkup  The patient tells me that her hair is falling out and her nails have become brittle  She recently saw her rheumatologist who ordered a battery of blood work to evaluate the symptoms  Patient notes that her TSH was lower than it was previously and is concerned, even though both TSH numbers are in the normal range  She never had her mammogram that I ordered back in March of 2021  She tells me she does not feel comfortable going to the hospital because of [place]  She tells me her migraines have been good  Her depression varies  She tells me she deals with her neuro psychiatrist once a month  She is exercising regularly on a treadmill  Really does not get out of the house much  She tells me she occasionally gets heartburn but not very often        The following portions of the patient's history were reviewed and updated as appropriate: allergies, current medications, past family history, past medical history, past social history, past surgical history and problem list     Review of Systems   Gastrointestinal: Negative for abdominal distention, abdominal pain, blood in stool, constipation, diarrhea and nausea  Reports heartburn once or twice a month   Genitourinary:        Reports urinary incontinence  Reports frequent vaginal yeast infections  Reports vaginal infections from her urinary incontinence   Skin:        Reports hair loss of brittle nails   Neurological: Positive for headaches  Psychiatric/Behavioral: Positive for dysphoric mood and sleep disturbance  Objective:      /82 (BP Location: Left arm, Patient Position: Sitting, Cuff Size: Adult)   Pulse 92   Temp 97 9 °F (36 6 °C) (Tympanic)   Ht 5' 8" (1 727 m)   Wt 64 1 kg (141 lb 4 8 oz)   SpO2 98%   BMI 21 48 kg/m²          Physical Exam  Vitals reviewed  Constitutional:       Comments: This is a 79-year-old white female who is well nourished, neat in appearance, and in no distress   HENT:      Head: Normocephalic and atraumatic  Right Ear: Tympanic membrane, ear canal and external ear normal       Left Ear: Tympanic membrane, ear canal and external ear normal       Mouth/Throat:      Mouth: Mucous membranes are moist       Pharynx: Oropharynx is clear  No oropharyngeal exudate or posterior oropharyngeal erythema  Eyes:      General: No scleral icterus  Right eye: No discharge  Left eye: No discharge  Conjunctiva/sclera: Conjunctivae normal       Pupils: Pupils are equal, round, and reactive to light  Neck:      Vascular: No carotid bruit  Comments: There is no thyromegaly noted  Cardiovascular:      Rate and Rhythm: Normal rate and regular rhythm  Heart sounds: Normal heart sounds  No murmur heard  No friction rub  No gallop  Pulmonary:      Effort: Pulmonary effort is normal  No respiratory distress  Breath sounds: Normal breath sounds  No stridor  No wheezing, rhonchi or rales  Abdominal:      General: Bowel sounds are normal  There is no distension  Palpations: Abdomen is soft  There is no mass  Tenderness: There is no abdominal tenderness  There is no guarding  Comments: There is no organomegaly   Musculoskeletal:      Cervical back: Neck supple  Lymphadenopathy:      Cervical: No cervical adenopathy  Skin:     Comments: Hair looked nice and thick  No evidence of alopecia  Hair was not brittle  There were no broken shafts  Nails appear normal    Psychiatric:         Mood and Affect: Mood normal          Behavior: Behavior normal          Thought Content:  Thought content normal          Judgment: Judgment normal        extremities:  Without cyanosis, clubbing, or edema

## 2022-02-25 NOTE — ASSESSMENT & PLAN NOTE
I encouraged the patient to get her mammogram   She just simply does not feel safe going to the hospital   I told her that cancers are being detected at later stages now because people or refusing to get routine screening tests out of fear  She argues that she does not have a family history of breast cancer  That really does not matter as breast cancer will occur in 1 out of every 9 women    I ordered a repeat mammogram

## 2022-02-25 NOTE — ASSESSMENT & PLAN NOTE
I reviewed labs that she had done for her rheumatologist   Her thyroid function was normal   She is going to continue levothyroxine 50 mcg daily

## 2022-02-25 NOTE — ASSESSMENT & PLAN NOTE
Patient reports very frequent vaginal yeast infections  I note on her labs that her blood sugars were normal   She requested refill on her Diflucan  Wanted more than 1 tablet

## 2022-02-25 NOTE — ASSESSMENT & PLAN NOTE
IgG levels have been normal lately  IgA and IgM were low  The patient will continue follow-up with her immunologist   I told the patient not to let the guard down as the pandemic is still not over, despite what everyone seems to be talking about on TV

## 2022-02-25 NOTE — ASSESSMENT & PLAN NOTE
Patient has urinary incontinence  I recommended referral to Dr Olivia sanches for further evaluation and treatment    The patient was agreeable and referral was placed

## 2022-03-02 ENCOUNTER — TELEPHONE (OUTPATIENT)
Dept: ADMINISTRATIVE | Facility: OTHER | Age: 62
End: 2022-03-02

## 2022-03-02 NOTE — TELEPHONE ENCOUNTER
Upon review of the In Basket request we were able to locate, review, and update the patient chart as requested for CRC: Colonoscopy  Any additional questions or concerns should be emailed to the Practice Liaisons via [email or web address] email, please do not reply via In Basket      Thank you  Kerwin Burris MA

## 2022-03-02 NOTE — TELEPHONE ENCOUNTER
----- Message from Christian Edwards sent at 3/1/2022  3:29 PM EST -----  Regarding: CARE GAP REQUEST  Contact: [phone]  03/01/22 3:29 PM    Hello, our patient No patient name on file  has had CRC: Colonoscopy completed/performed  Please assist in updating the patient chart by making an External outreach to DR Martina Dos Santos  facility located in [facility]  The date of service is 1      Thank you,  Rajinder De La Torre MA   [place]

## 2022-03-15 DIAGNOSIS — G47.00 INSOMNIA, UNSPECIFIED TYPE: ICD-10-CM

## 2022-03-15 RX ORDER — ZOLPIDEM TARTRATE 10 MG/1
TABLET ORAL
Qty: 90 TABLET | Refills: 1 | Status: SHIPPED | OUTPATIENT
Start: 2022-03-15

## 2022-03-15 NOTE — PROGRESS NOTES
The [facility] prescription drug monitoring program was queried  There were no red flags  Safe to proceed

## 2022-04-20 ENCOUNTER — VBI (OUTPATIENT)
Dept: ADMINISTRATIVE | Facility: OTHER | Age: 62
End: 2022-04-20

## 2022-05-05 ENCOUNTER — TELEPHONE (OUTPATIENT)
Dept: FAMILY MEDICINE CLINIC | Facility: CLINIC | Age: 62
End: 2022-05-05

## 2022-05-05 NOTE — TELEPHONE ENCOUNTER
Patient asking if you could plese call her she has questions  She is thinking of buying a property but it has a high level power transmitter on it  She is concerned due to her neurological issues

## 2022-06-28 ENCOUNTER — VBI (OUTPATIENT)
Dept: ADMINISTRATIVE | Facility: OTHER | Age: 62
End: 2022-06-28

## 2022-08-08 DIAGNOSIS — B37.31 VAGINAL YEAST INFECTION: ICD-10-CM

## 2022-08-09 RX ORDER — FLUCONAZOLE 150 MG/1
TABLET ORAL
Qty: 3 TABLET | Refills: 0 | Status: SHIPPED | OUTPATIENT
Start: 2022-08-09 | End: 2022-08-12

## 2022-09-10 DIAGNOSIS — G47.00 INSOMNIA, UNSPECIFIED TYPE: ICD-10-CM

## 2022-09-12 RX ORDER — ZOLPIDEM TARTRATE 10 MG/1
TABLET ORAL
Qty: 90 TABLET | Refills: 0 | Status: SHIPPED | OUTPATIENT
Start: 2022-09-12

## 2022-09-29 ENCOUNTER — OFFICE VISIT (OUTPATIENT)
Dept: DERMATOLOGY | Facility: CLINIC | Age: 62
End: 2022-09-29
Payer: COMMERCIAL

## 2022-09-29 ENCOUNTER — TELEPHONE (OUTPATIENT)
Dept: DERMATOLOGY | Facility: CLINIC | Age: 62
End: 2022-09-29

## 2022-09-29 VITALS — HEIGHT: 68 IN | BODY MASS INDEX: 21.48 KG/M2

## 2022-09-29 DIAGNOSIS — Z86.19 HISTORY OF SHINGLES: ICD-10-CM

## 2022-09-29 DIAGNOSIS — L21.9 SEBORRHEIC DERMATITIS: ICD-10-CM

## 2022-09-29 DIAGNOSIS — L65.9 HAIR LOSS: Primary | ICD-10-CM

## 2022-09-29 PROCEDURE — 99203 OFFICE O/P NEW LOW 30 MIN: CPT | Performed by: STUDENT IN AN ORGANIZED HEALTH CARE EDUCATION/TRAINING PROGRAM

## 2022-09-29 RX ORDER — MINOXIDIL 2.5 MG/1
1.25 TABLET ORAL DAILY
Qty: 15 TABLET | Refills: 3 | Status: SHIPPED | OUTPATIENT
Start: 2022-09-29 | End: 2022-10-29

## 2022-09-29 RX ORDER — VALACYCLOVIR HYDROCHLORIDE 500 MG/1
500 TABLET, FILM COATED ORAL DAILY
Qty: 30 TABLET | Refills: 3 | Status: SHIPPED | OUTPATIENT
Start: 2022-09-29 | End: 2022-12-29

## 2022-09-29 RX ORDER — CLOBETASOL PROPIONATE 0.5 MG/G
AEROSOL, FOAM TOPICAL
Qty: 50 G | Refills: 2 | Status: SHIPPED | OUTPATIENT
Start: 2022-09-29

## 2022-09-29 NOTE — PATIENT INSTRUCTIONS
HAIR LOSS: AGA + SEBORRHEIC DERMATITIS +/- TELOGEN EFFLUVIUM    Assessment and Plan:  Based on a thorough discussion of this condition and the management approach to it (including a comprehensive discussion of the known risks, side effects and potential benefits of treatment), the patient (family) agrees to implement the following specific plan:  Check labs: TSH, vitamin D (25 hydroxy), ferritin  Start cobetasol foam daily to scalp as needed for itch  Start 1/2 tab of minoxidil 2 5 mg tablet daily (discussed risk of increased hair growth in other areas, pericardial effusion, leg swelling, increased heart rate)    HERPES SIMPLEX verses SHINGLES        Assessment and Plan:  Based on a thorough discussion of this condition and the management approach to it (including a comprehensive discussion of the known risks, side effects and potential benefits of treatment), the patient (family) agrees to implement the following specific plan:    Try valtrex 500 mg once daily for 3 months     What is herpes simplex? Herpes simplex is a common viral infection that presents with localized blistering  It affects most people on one or more occasions during their lives  Herpes simplex is commonly referred to as cold sores or fever blisters, as recurrences are often triggered by a febrile illness, such as a cold  What causes herpes simplex? Herpes simplex is caused by one of two types of herpes simplex virus (HSV), members of the Herpesvirales family of double-stranded DNA viruses  Type 1 HSV is mainly associated with oral and facial infections   Type 2 HSV is mainly associated with genital and rectal infections (anogenital herpes)    However, either virus can affect almost any area of skin or mucous membrane  After the primary episode of infection, HSV resides in a latent state in spinal dorsal root nerves that supply sensation to the skin   During a recurrence, the virus follows the nerves onto the skin or mucous membranes, where it multiplies, causing the clinical lesion  After each attack and lifelong, it enters the resting state  During an attack, the virus can be inoculated into new sites of skin, which can then develop blisters as well as the original site of infection  Who gets herpes simplex? Primary attacks of Type 1 HSV infections occur mainly in infants and young children  In crowded, underdeveloped areas of the world, nearly all children have been infected by the age of 11  In less crowded places, the incidence is lower; for example, less than half of university entrants in [facility] have been infected  Type 2 HSV infections occur mainly after puberty and are often transmitted sexually  HSV is transmitted by direct or indirect contact with someone with active herpes simplex, which is infectious for 7-12 days  Asymptomatic shedding of the virus in saliva or genital secretions can also lead to transmission of HSV, but this is infrequent, as the amount shed from inactive lesions is 100 to 1000 times less than when it is active  The incubation period is 2-12 days  Minor injury helps inoculate HSV into the skin  For example:  A thumb sucker may transmit the virus from their mouth to their thumb  A health-care worker may develop herpetic nancy (paronychia)   A rugby player may get a cluster of blisters on one cheek ('scrumpox')  What are the clinical features of herpes simplex? Primary infection with HSV can be mild or subclinical, but symptomatic infection tends to be more severe than recurrences  Type 2 HSV is more often symptomatic than Type 1 HSV  Primary Type 1 HSV most often presents as gingivostomatitis, in children between 3and 11years of age  Symptoms include fever, which may be high, restlessness and excessive dribbling  Drinking and eating are painful, and the breath is foul  The gums are swollen and red and bleed easily   Whitish vesicles evolve to yellowish ulcers on the tongue, throat, palate and inside the cheeks  Local lymph glands are enlarged and tender  The fever subsides after 3-5 days and recovery is usually complete within 2 weeks  Primary Type 2 HSV usually presents as genital herpes after the onset of sexual activity  Painful vesicles, ulcers, redness and swelling last for 2 to 3 weeks, if untreated, and are often accompanied by fever and tender inguinal lymphadenopathy  In males, herpes most often affects the glans, foreskin and shaft of the penis  Anal herpes is more common in males who have sex with men than with heterosexual partners  In females, herpes most often arises on the vulva and in the vagina  It is often painful or difficult to pass urine  Infection of the cervix may progress to severe ulceration  Recurrent herpes simplex  After the initial infection, whether symptomatic or not, there may be no further clinical manifestations throughout life  Where viral immunity is insufficient, recurrent infections are common, particularly with Type 2 genital herpes  Recurrences can be triggered by:  Minor trauma, surgery or procedures to the affected area   Upper respiratory tract infections   Sun exposure   Hormonal factors (in women, flares are not uncommon prior to menstruation)   Emotional stress    In many cases, no reason for the eruption is evident  The vesicles tend to be smaller and more closely grouped in recurrent herpes, compared to primary herpes  They usually return to roughly the same site as the primary infection  Recurrent Type 1 HSV can occur on any site, most frequently the face, particularly the lips (herpes simplex labialis)  Recurrent Type 2 HSV may also occur on any site, but most often affects the genitals or buttocks  Itching or burning is followed an hour or two later by an irregular cluster of small, closely grouped, often umbilicated vesicles on a red base  They normally heal in 7-10 days without scarring   The affected person may feel well or suffer from fever, pain and have enlarged local lymph nodes  Herpetic vesicles are sometimes arranged in a line rather like shingles and are said to have a zosteriform distribution, particularly when affecting the lower chest or lumbar region  White patches or scars may occur at the site of recurrent HSV attacks and are more evident in those with the skin of color  How is herpes simplex diagnosed? If there is clinical doubt, HSV can be confirmed by culture or PCR of a viral swab taken from fresh vesicles  HSV serology is not very informative, as it's positive in most individuals and thus not specific for the lesion with which they present  What are the complications of herpes simplex? Eye infection: Herpes simplex may cause swollen eyelids and conjunctivitis with opacity and superficial ulceration of the cornea (dendritic ulcer, best seen after fluorescein staining of the cornea)  Throat infection: may be very painful and interfere with swallowing  Eczema herpeticum: In patients with a history of atopic dermatitis or Darier disease, HSV may result in severe and widespread infection, known as eczema herpeticum  The skin disease can be active or historical  Numerous blisters erupt on the face or elsewhere, associated with swollen lymph glands and fever  Erythema multiform: A single episode or recurrent erythema multiforme is an uncommon reaction to herpes simplex  The rash of erythema multiforme appears as symmetrical plaques on hands, forearms, feet and lower legs  It is characterised by target lesions, which sometimes have central blisters  Mucosal lesions may be observed  Nervous system: Cranial/facial nerves may be infected by HSV, producing temporary paralysis of the affected muscles  Rarely, neuralgic pain may precede each recurrence of herpes by 1 or 2 days (Feliz syndrome)  Meningitis is rare    Widespread infection: Disseminated infection and/or persistent ulceration due to HSV can be serious in debilitated or immune deficient patients, for example in people with human immunodeficiency virus (HIV) infection  What is the treatment for herpes simplex? Mild, uncomplicated eruptions of herpes simplex require no treatment  Blisters may be covered if desired, for example with a hydrocolloid patch  Severe infection may require treatment with an antiviral agent  Antiviral drugs used for herpes simplex and their usual doses are:  Aciclovir - 200 mg 5 times daily for five days   Valaciclovir - 1 g 3 times daily for seven days   Famciclovir - as a single dose of 3 x 500 mg    Higher doses of antiviral drugs are used for eczema herpeticum or for disseminated herpes simplex  Topical aciclovir or penciclovir may shorten attacks of recurrent herpes simplex, provided the cream is started early enough  Can herpes simplex be prevented? As sun exposure often triggers facial herpes simplex, sun protection using high protection factor sunscreens and other measures are important  Antiviral drugs will stop HSV multiplying once it reaches the skin or mucous membranes but cannot eradicate the virus from its resting stage within the nerve cells  They can, therefore, shorten and prevent attacks but a single course cannot prevent future attacks  Repeated courses may be prescribed, or the medication may be taken continuously to prevent frequent attacks

## 2022-09-29 NOTE — PROGRESS NOTES
[address] [facility] Note     Patient Name: Deanne Kamara  Encounter Date: 9/29/22     Have you been cared for by a [facility] Dermatologist in the last 3 years and, if so, which one? No    · Have you traveled outside of the [facility] in the past 3 months? No     May we call your Preferred Phone number to discuss your specific medical information? Yes     May we leave a detailed message that includes your specific medical information? Yes      Today's Chief Concerns:   Concern #1:  rash      Past Medical History:  Have you personally ever had or currently have any of the following? · Skin cancer (such as Melanoma, Basal Cell Carcinoma, Squamous Cell Carcinoma? (If Yes, please provide more detail)- No  · Eczema: No  · Psoriasis: No  · HIV/AIDS: No  · Hepatitis B or C: No  · Tuberculosis: No  · Systemic Immunosuppression such as Diabetes, Biologic or Immunotherapy, Chemotherapy, Organ Transplantation, Bone Marrow Transplantation (If YES, please provide more detail): No  · Radiation Treatment (If YES, please provide more detail): No  · Any other major medical conditions/concerns? (If Yes, which types)- YES, autoimmune deficiency, thyroid disease, hyperlipidemia, hashimoto's, migraines    Social History:    What is/was your primary occupation? retired        Family history:    Have any of your "first degree relatives" (parent, brother, sister, or child) had any of the following       · Skin cancer such as Melanoma or Merkel Cell Carcinoma or Pancreatic Cancer? No  · Eczema, Asthma, Hay Fever or Seasonal Allergies: No  · Psoriasis or Psoriatic Arthritis: No  · Do any other medical conditions seem to run in your family? If Yes, what condition and which relatives?   YES, heart disease    Current Medications :    Current Outpatient Medications:     acetaminophen (TYLENOL) 325 mg tablet, Take 650 mg by mouth daily at bedtime , Disp: , Rfl:     amitriptyline (ELAVIL) 10 mg tablet, Take 2 tablets (20 mg total) by mouth daily at bedtime, Disp: 180 tablet, Rfl: 3    b complex vitamins tablet, Take 1 tablet by mouth 2 (two) times a week, Disp: , Rfl:     cholecalciferol (VITAMIN D3) 1,000 units tablet, Take 2,000 Units by mouth daily , Disp: , Rfl:     Cimetidine (TAGAMET PO), Take 1 tablet by mouth as needed (gerd), Disp: , Rfl:     cyanocobalamin (VITAMIN B-12) 100 mcg tablet, Take 100 mcg by mouth 2 (two) times a week , Disp: , Rfl:     estrogen, conjugated,-medroxyprogesterone (Prempro) 0 3-1 5 MG per tablet, Take 1 tablet by mouth daily, Disp: 84 tablet, Rfl: 2    levothyroxine (Synthroid) 50 mcg tablet, Take 1 tablet (50 mcg total) by mouth daily, Disp: 90 tablet, Rfl: 3    Multiple Vitamin (MULTI-VITAMIN DAILY) TABS, Take by mouth, Disp: , Rfl:     mupirocin (BACTROBAN) 2 % ointment, Apply topically 2 (two) times a day, Disp: 22 g, Rfl: 0    Omega-3 Fatty Acids (FISH OIL PO), Take 1 capsule by mouth 2 (two) times a week, Disp: , Rfl:     prednisoLONE acetate (PRED FORTE) 1 % ophthalmic suspension, Administer 1 drop to both eyes 3 (three) times a day, Disp: 5 mL, Rfl: 2    pregabalin (LYRICA) 50 mg capsule, Take 1 capsule (50 mg total) by mouth 2 (two) times a day, Disp: 180 capsule, Rfl: 1    rizatriptan (MAXALT) 5 MG tablet, Take 5 mg by mouth as needed , Disp: , Rfl:     sertraline (ZOLOFT) 50 mg tablet, Take 2 tablets (100 mg total) by mouth daily at bedtime, Disp: 90 tablet, Rfl: 2    Zinc 50 MG TABS, Take 1 tablet by mouth daily, Disp: , Rfl:     zolpidem (AMBIEN) 10 mg tablet, TAKE 1 TABLET BY MOUTH ONCE DAILY AT BEDTIME AS NEEDED FOR SLEEP, Disp: 90 tablet, Rfl: 0      Review of Systems/System Alerts:  Have you recently had or currently have any of the following? If YES, what are you doing for the problem? ·   · FEMALES ONLY:    · Are you pregnant or planning to become pregnant? No  · Are you currently or planning to be nursing or breast feeding?  No  · Any known allergies? YES, see below  Allergies   Allergen Reactions    Cleocin  [Clindamycin Hcl] GI Intolerance    Clindamycin GI Intolerance and Vomiting     Reaction Date: [id number];    ·       PHYSICAL EXAM:       Was a chaperone (Derm Clinical Assistant) present throughout the entire Physical Exam? Yes     Did the Dermatology Team specifically  the patient on the importance of a Full Skin Exam to be sure that nothing is missed clinically? Yes}  o Did the patient request or accept a Full Skin Exam?  NO      CONSTITUTIONAL): There were no vitals filed for this visit  PSYCH: Normal mood and affect  EYES: Normal conjunctiva  ENT: Normal lips and oral mucosa  CARDIOVASCULAR: No edema  RESPIRATORY: Normal respirations  HEME/LYMPH/IMMUNO:  No ostensible subQ swelling except as noted below in ASSESSMENT AND PLAN BY DIAGNOSIS    SKIN:  FULL ORGAN SYSTEM EXAM  Hair, Scalp, Face Normal except as noted below in Assessment   Neck Normal except as noted below in Assessment        HAIR LOSS: AGA + SEBORRHEIC DERMATITIS +/- TELOGEN EFFLUVIUM  Physical Exam:   Anatomic Location Affected:  scalp   Morphological Description:  Increased variability of hair diameters, no scale   Pertinent Positives:   Pertinent Negatives: Additional History of Present Condition:  Pt reports itchiness on scalp occasionally  Patient states within the last 2 months started to lose hair and started with a rash broke out on the right neck and then a little on the left neck and then in the groin area  Tried using a miconazole OTC powder without improvement  No active rash at this time  Has photos of rash and the hair loss in phone  Also states that 3 years ago she started with a rash on right buttock and was diagnosed with a staph infection and then was told it was herpes simplex  Rash tended to come and go and would clear up with neosporin or mupirocin 2% ointment  Not sure is all could be related although no rash is present  Assessment and Plan:  Based on a thorough discussion of this condition and the management approach to it (including a comprehensive discussion of the known risks, side effects and potential benefits of treatment), the patient (family) agrees to implement the following specific plan:   Check labs: TSH, vitamin D (25 hydroxy), ferritin   Start cobetasol foam daily to scalp as needed for itch   Start 1/2 tab of minoxidil 2 5 mg tablet daily (discussed risk of increased hair growth in other areas, pericardial effusion, leg swelling, increased heart rate)    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: RECURRENT BUT NOT PRESENT TODAY    Physical Exam:   Anatomic Location Affected:  Right buttock   Morphological Description:  Not present    Pertinent Positives:   Pertinent Negatives: Additional History of Present Condition:  Also states that 3 years ago she started with a rash on right buttock and was diagnosed with a staph infection and then was told it was herpes simplex  Rash tended to come and go and would clear up with neosporin or mupirocin 2% ointment  Not sure is all could be related although no rash is present  Assessment and Plan:  Based on a thorough discussion of this condition and the management approach to it (including a comprehensive discussion of the known risks, side effects and potential benefits of treatment), the patient (family) agrees to implement the following specific plan:     Try valtrex 500 mg once daily for 3 months as prophylaxis    What is herpes simplex? Herpes simplex is a common viral infection that presents with localized blistering  It affects most people on one or more occasions during their lives  Herpes simplex is commonly referred to as cold sores or fever blisters, as recurrences are often triggered by a febrile illness, such as a cold  What causes herpes simplex?   Herpes simplex is caused by one of two types of herpes simplex virus (HSV), members of the Herpesvirales family of double-stranded DNA viruses   Type 1 HSV is mainly associated with oral and facial infections    Type 2 HSV is mainly associated with genital and rectal infections (anogenital herpes)     However, either virus can affect almost any area of skin or mucous membrane  After the primary episode of infection, HSV resides in a latent state in spinal dorsal root nerves that supply sensation to the skin  During a recurrence, the virus follows the nerves onto the skin or mucous membranes, where it multiplies, causing the clinical lesion  After each attack and lifelong, it enters the resting state  During an attack, the virus can be inoculated into new sites of skin, which can then develop blisters as well as the original site of infection  Who gets herpes simplex? Primary attacks of Type 1 HSV infections occur mainly in infants and young children  In crowded, underdeveloped areas of the world, nearly all children have been infected by the age of 11  In less crowded places, the incidence is lower; for example, less than half of university entrants in [facility] have been infected  Type 2 HSV infections occur mainly after puberty and are often transmitted sexually  HSV is transmitted by direct or indirect contact with someone with active herpes simplex, which is infectious for 7-12 days  Asymptomatic shedding of the virus in saliva or genital secretions can also lead to transmission of HSV, but this is infrequent, as the amount shed from inactive lesions is 100 to 1000 times less than when it is active  The incubation period is 2-12 days  Minor injury helps inoculate HSV into the skin  For example:   A thumb sucker may transmit the virus from their mouth to their thumb   A health-care worker may develop herpetic nancy (paronychia)    A rugby player may get a cluster of blisters on one cheek ('scrumpox')     What are the clinical features of herpes simplex?   Primary infection with HSV can be mild or subclinical, but symptomatic infection tends to be more severe than recurrences  Type 2 HSV is more often symptomatic than Type 1 HSV  Primary Type 1 HSV most often presents as gingivostomatitis, in children between 3and 11years of age  Symptoms include fever, which may be high, restlessness and excessive dribbling  Drinking and eating are painful, and the breath is foul  The gums are swollen and red and bleed easily  Whitish vesicles evolve to yellowish ulcers on the tongue, throat, palate and inside the cheeks  Local lymph glands are enlarged and tender  The fever subsides after 3-5 days and recovery is usually complete within 2 weeks  Primary Type 2 HSV usually presents as genital herpes after the onset of sexual activity  Painful vesicles, ulcers, redness and swelling last for 2 to 3 weeks, if untreated, and are often accompanied by fever and tender inguinal lymphadenopathy  In males, herpes most often affects the glans, foreskin and shaft of the penis  Anal herpes is more common in males who have sex with men than with heterosexual partners  In females, herpes most often arises on the vulva and in the vagina  It is often painful or difficult to pass urine  Infection of the cervix may progress to severe ulceration  Recurrent herpes simplex  After the initial infection, whether symptomatic or not, there may be no further clinical manifestations throughout life  Where viral immunity is insufficient, recurrent infections are common, particularly with Type 2 genital herpes  Recurrences can be triggered by:   Minor trauma, surgery or procedures to the affected area    Upper respiratory tract infections    Sun exposure    Hormonal factors (in women, flares are not uncommon prior to menstruation)    Emotional stress    In many cases, no reason for the eruption is evident    The vesicles tend to be smaller and more closely grouped in recurrent herpes, compared to primary herpes  They usually return to roughly the same site as the primary infection   Recurrent Type 1 HSV can occur on any site, most frequently the face, particularly the lips (herpes simplex labialis)   Recurrent Type 2 HSV may also occur on any site, but most often affects the genitals or buttocks     Itching or burning is followed an hour or two later by an irregular cluster of small, closely grouped, often umbilicated vesicles on a red base  They normally heal in 7-10 days without scarring  The affected person may feel well or suffer from fever, pain and have enlarged local lymph nodes  Herpetic vesicles are sometimes arranged in a line rather like shingles and are said to have a zosteriform distribution, particularly when affecting the lower chest or lumbar region  White patches or scars may occur at the site of recurrent HSV attacks and are more evident in those with the skin of color  How is herpes simplex diagnosed? If there is clinical doubt, HSV can be confirmed by culture or PCR of a viral swab taken from fresh vesicles  HSV serology is not very informative, as it's positive in most individuals and thus not specific for the lesion with which they present  What are the complications of herpes simplex?  Eye infection: Herpes simplex may cause swollen eyelids and conjunctivitis with opacity and superficial ulceration of the cornea (dendritic ulcer, best seen after fluorescein staining of the cornea)   Throat infection: may be very painful and interfere with swallowing   Eczema herpeticum: In patients with a history of atopic dermatitis or Darier disease, HSV may result in severe and widespread infection, known as eczema herpeticum  The skin disease can be active or historical  Numerous blisters erupt on the face or elsewhere, associated with swollen lymph glands and fever   Erythema multiform: A single episode or recurrent erythema multiforme is an uncommon reaction to herpes simplex   The rash of erythema multiforme appears as symmetrical plaques on hands, forearms, feet and lower legs  It is characterised by target lesions, which sometimes have central blisters  Mucosal lesions may be observed   Nervous system: Cranial/facial nerves may be infected by HSV, producing temporary paralysis of the affected muscles  Rarely, neuralgic pain may precede each recurrence of herpes by 1 or 2 days (Feliz syndrome)  Meningitis is rare   Widespread infection: Disseminated infection and/or persistent ulceration due to HSV can be serious in debilitated or immune deficient patients, for example in people with human immunodeficiency virus (HIV) infection  What is the treatment for herpes simplex? Mild, uncomplicated eruptions of herpes simplex require no treatment  Blisters may be covered if desired, for example with a hydrocolloid patch  Severe infection may require treatment with an antiviral agent  Antiviral drugs used for herpes simplex and their usual doses are:   Aciclovir - 200 mg 5 times daily for five days    Valaciclovir - 1 g 3 times daily for seven days    Famciclovir - as a single dose of 3 x 500 mg    Higher doses of antiviral drugs are used for eczema herpeticum or for disseminated herpes simplex  Topical aciclovir or penciclovir may shorten attacks of recurrent herpes simplex, provided the cream is started early enough  Can herpes simplex be prevented? As sun exposure often triggers facial herpes simplex, sun protection using high protection factor sunscreens and other measures are important  Antiviral drugs will stop HSV multiplying once it reaches the skin or mucous membranes but cannot eradicate the virus from its resting stage within the nerve cells  They can, therefore, shorten and prevent attacks but a single course cannot prevent future attacks  Repeated courses may be prescribed, or the medication may be taken continuously to prevent frequent attacks      Scribe Attestation I,:  Letty Reaves MA am acting as a scribe while in the presence of the attending physician :       I,:  Cameron Guerrero MD personally performed the services described in this documentation    as scribed in my presence :

## 2022-09-29 NOTE — TELEPHONE ENCOUNTER
Pt called and said she saw Dr Christoph Buckner today at [facility] and he prescribed her 3 medications, she spoke to her pharmacy who is saying that Dr Christoph Buckner NPI is not good and asked the pt if she saw a real doctor  Pt advised them yes and he is with [facility] and she read them the NPI she had and they said it was the same  I also had her read it to me and it's the same one we have  If someone could call the pharmacy on the pt chart to see what is going on  Pt said this pharmacy is always having an issue   Thank you

## 2022-10-01 LAB
25(OH)D3+25(OH)D2 SERPL-MCNC: 36.9 NG/ML (ref 30–100)
FERRITIN SERPL-MCNC: 111 NG/ML (ref 15–150)
T4 FREE SERPL-MCNC: 1.22 NG/DL (ref 0.82–1.77)
TSH SERPL DL<=0.005 MIU/L-ACNC: 2.29 UIU/ML (ref 0.45–4.5)

## 2022-10-03 ENCOUNTER — TELEPHONE (OUTPATIENT)
Dept: DERMATOLOGY | Facility: CLINIC | Age: 62
End: 2022-10-03

## 2022-10-03 NOTE — TELEPHONE ENCOUNTER
----- Message from Blythe Denver, MD sent at 10/3/2022  2:06 PM EDT -----  Luis Clinton: please inform blood work looks good and not cause of alopecia  Thank you!

## 2022-11-10 ENCOUNTER — VBI (OUTPATIENT)
Dept: ADMINISTRATIVE | Facility: OTHER | Age: 62
End: 2022-11-10

## 2022-11-11 ENCOUNTER — OFFICE VISIT (OUTPATIENT)
Dept: FAMILY MEDICINE CLINIC | Facility: CLINIC | Age: 62
End: 2022-11-11

## 2022-11-11 VITALS
HEIGHT: 68 IN | TEMPERATURE: 96 F | BODY MASS INDEX: 19.7 KG/M2 | OXYGEN SATURATION: 99 % | DIASTOLIC BLOOD PRESSURE: 72 MMHG | SYSTOLIC BLOOD PRESSURE: 110 MMHG | HEART RATE: 79 BPM | WEIGHT: 130 LBS

## 2022-11-11 DIAGNOSIS — Z13.220 SCREENING FOR HYPERLIPIDEMIA: ICD-10-CM

## 2022-11-11 DIAGNOSIS — F33.1 MODERATE EPISODE OF RECURRENT MAJOR DEPRESSIVE DISORDER (HCC): Primary | ICD-10-CM

## 2022-11-11 DIAGNOSIS — S06.9X9D TRAUMATIC BRAIN INJURY WITH LOSS OF CONSCIOUSNESS, SUBSEQUENT ENCOUNTER: ICD-10-CM

## 2022-11-11 DIAGNOSIS — Z11.4 SCREENING FOR HIV (HUMAN IMMUNODEFICIENCY VIRUS): ICD-10-CM

## 2022-11-11 DIAGNOSIS — Z23 ENCOUNTER FOR IMMUNIZATION: ICD-10-CM

## 2022-11-11 DIAGNOSIS — K21.9 GASTROESOPHAGEAL REFLUX DISEASE WITHOUT ESOPHAGITIS: ICD-10-CM

## 2022-11-11 DIAGNOSIS — Z12.31 ENCOUNTER FOR SCREENING MAMMOGRAM FOR BREAST CANCER: ICD-10-CM

## 2022-11-11 DIAGNOSIS — E06.3 HASHIMOTO'S DISEASE: ICD-10-CM

## 2022-11-11 DIAGNOSIS — Z12.4 SCREENING FOR CERVICAL CANCER: ICD-10-CM

## 2022-11-11 DIAGNOSIS — D83.9 COMMON VARIABLE IMMUNODEFICIENCY (HCC): ICD-10-CM

## 2022-11-11 DIAGNOSIS — Z11.59 NEED FOR HEPATITIS C SCREENING TEST: ICD-10-CM

## 2022-11-11 DIAGNOSIS — G43.009 MIGRAINE WITHOUT AURA AND WITHOUT STATUS MIGRAINOSUS, NOT INTRACTABLE: ICD-10-CM

## 2022-11-11 RX ORDER — CALCIUM ASCORBATE DIHYDRATE, CHOLECALCIFEROL, DL-A TOCOPHERYL ACETATE, RIBOFLAVIN, NIACINAMIDE, FOLATE, BIOTIN, CALCIUM, FERROUS ASPARTO GLYCINATE, POTASSIUM IODIDE, MAGNESIUM OXIDE, ZINC BISGLYCINATE CHELATE, ALPHA LINOLEIC ACID, DHA, EPA 25; 1000; 15; 1.5; 10; 50; 1; 1; 100; 150; 50; 15; 50; 42 MG/1; [IU]/1; [IU]/1; MG/1; MG/1; MG/1; MG/1; MG/1; MG/1; UG/1; MG/1; MG/1; MG/1; MG/1
PILL ORAL
COMMUNITY

## 2022-11-11 NOTE — PATIENT INSTRUCTIONS
Here to establish care and reviewed  hx and will rec f-up with specialists as directed dermatology and immunology and rec GYN and get mammogram  Colon screening is UTD and is due in 2026  Rec f-up with psychology every 4 weeks  Has appt to see derm in 3 months  Monitor thyroid  Rec labs in 6 months with office visit and GYN and mammogram ordered  Has IBS and diarrhea at times and also ruq pain at times

## 2022-11-11 NOTE — PROGRESS NOTES
Name: Tereza Caruso      : 1960      MRN: [id number]  Encounter Provider: Afua Auguste DO  Encounter Date: 2022   Encounter department: [address] [place] [address]  Chief Complaint   Patient presents with   • New Patient Visit     Pt states she would like to discuss many things with you      Patient Instructions   Here to establish care and reviewed  hx and will rec f-up with specialists as directed dermatology and immunology and rec GYN and get mammogram  Colon screening is UTD and is due in   Rec f-up with psychology every 4 weeks  Has appt to see derm in 3 months  Monitor thyroid  Rec labs in 6 months with office visit and GYN and mammogram ordered  Has IBS and diarrhea at times and also ruq pain at times  Assessment & Plan     1  Moderate episode of recurrent major depressive disorder ([facility] )    2  Need for hepatitis C screening test    3  Screening for HIV (human immunodeficiency virus)    4  Encounter for immunization    5  Screening for cervical cancer  -     Ambulatory referral to Obstetrics / Gynecology; Future    6  Encounter for screening mammogram for breast cancer  -     Mammo screening bilateral w 3d & cad; Future; Expected date: 2022    7  Traumatic brain injury with loss of consciousness, subsequent encounter    8  Hashimoto's disease  -     TSH, 3rd generation; Future; Expected date: 2023  -     T4, free; Future; Expected date: 2023  -     Comprehensive metabolic panel; Future; Expected date: 2023    9  Gastroesophageal reflux disease without esophagitis  -     CBC and differential; Future; Expected date: 2023    10  Common variable immunodeficiency ([facility] )    11  Migraine without aura and without status migrainosus, not intractable    12  Screening for hyperlipidemia  -     Lipid Panel with Direct LDL reflex;  Future           Subjective      New Patient Visit (Pt states she would like to discuss many things with you ) Traumatic brain injury 2015 and fell on ice and fractured right orbit and also right wrist fracture right sinuses and trigeminal neuralgia  Seen neuro in past for this and chronic migraine, and diagnosed with nystagmus and stopped seeing neurology at [place] [address]  Speech therapist said there is something else wrong  Has balance issues as well  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, diplopia and positive midline shift in right eye and saw ophthalmology Dr Luz Prajapati in [place] for this  Stopped therapy in 2019  Balance issues under control and vision right eye would split and is now under control  Right eye issues from traumatic brain injury  Hx of depression and anxiety  Suggested to see Dr Luz Prajapati in [place]  Tried different meds and was told she was bipolar  Pain was not under control at all and saw a pain specialist at [facility] and started botox injections to forehead and saw dramatic improvement in pain level  Migraines were not getting better with Dr Claudean Sprout and rizatriptan and lyrica helped  Still gets migraines but not as severe since starting these 2 meds  Pain added to depression and anxiety  Saw Dr Maggie Andersen in past  About 1 11/2 years ago went to pcp and c/o hair loss, and immediate memory worse and ran labs and went back for yearly check up and stated hair was falling off  Brittle nails  Saw dermatology at [place] [address] for this and was diagnosed with androgenic allopecia and Herpes zoster and a case of shingles  Has had rash on right buttock recurrent  Started on minoxidil and valtrex and clobetasol foam to help with scalp itching  Did labs and does have Hashimotos thyroiditis and vit d and iron, iron nl and vit d nl and tsh was wnl  No alopecia in family  Pain in right side of neck and following day showed a rash and was diagnosed as shingles  Maybe some residual pain as well   In [address] and diagnosed with IgG deficiency and low - saw immunologist in past with infusions monthly and was on infusions for 20 years  In 2019 IgG was wnl and said Does see allergy immunology in [place] and goes and sees them once per year  Common Year variable immune def subclass IgG  Patient was seen at [facility] in past  Poor response to vaccinations  Sees Dr Vicente Almendarez in [place]  Covid vaccinated  Positive antibody for covid  Had biopsies for thyroid nodules and were negative for malignancy  Sees no GYN and last mammo around 2013  Colonoscopy UTD until 2026  Patient was hospitalized with pneumonia in past circa 2010 and 2011  Fractured lower left leg in past and had to wear a boot for 5 months and had a dvt and PE and was treated with lovenox injections  Isolated event and on no longe term anticoagulation   and has no children and currently in no relationship  Hx of asthma  Review of Systems   Constitutional: Negative  HENT: Negative  Eyes: Negative  Respiratory: Negative  Cardiovascular: Negative  Gastrointestinal: Negative  Endocrine: Negative  Genitourinary: Negative  Musculoskeletal: Negative  Skin: Negative  Allergic/Immunologic: Negative  Neurological: Negative  Hematological: Negative  Psychiatric/Behavioral: Negative  All other systems reviewed and are negative        Current Outpatient Medications on File Prior to Visit   Medication Sig   • acetaminophen (TYLENOL) 325 mg tablet Take 650 mg by mouth daily at bedtime    • amitriptyline (ELAVIL) 10 mg tablet Take 2 tablets (20 mg total) by mouth daily at bedtime   • cholecalciferol (VITAMIN D3) 1,000 units tablet Take 2,000 Units by mouth daily    • clobetasol (OLUX) 0 05 % topical foam Apply on scalp for itchy rash   • levothyroxine (Synthroid) 50 mcg tablet Take 1 tablet (50 mcg total) by mouth daily   • minoxidil (LONITEN) 2 5 mg tablet Take 0 5 tablets (1 25 mg total) by mouth daily   • Omega-3 Fatty Acids (FISH OIL PO) Take 1 capsule by mouth 2 (two) times a week   • prednisoLONE acetate (PRED FORTE) 1 % ophthalmic suspension Administer 1 drop to both eyes 3 (three) times a day   • pregabalin (LYRICA) 50 mg capsule Take 1 capsule (50 mg total) by mouth 2 (two) times a day   • Pren-Fe-Meth-FA-Omeg w/o A (PrimaCare) 30-1-470 MG CAPS Take by mouth   • rizatriptan (MAXALT) 5 MG tablet Take 5 mg by mouth as needed    • sertraline (ZOLOFT) 50 mg tablet Take 2 tablets (100 mg total) by mouth daily at bedtime   • valACYclovir (VALTREX) 500 mg tablet Take 1 tablet (500 mg total) by mouth daily   • zolpidem (AMBIEN) 10 mg tablet TAKE 1 TABLET BY MOUTH ONCE DAILY AT BEDTIME AS NEEDED FOR SLEEP   • b complex vitamins tablet Take 1 tablet by mouth 2 (two) times a week (Patient not taking: No sig reported)   • Cimetidine (TAGAMET PO) Take 1 tablet by mouth as needed (gerd) (Patient not taking: No sig reported)   • cyanocobalamin (VITAMIN B-12) 100 mcg tablet Take 100 mcg by mouth 2 (two) times a week  (Patient not taking: Reported on 9/29/2022)   • estrogen, conjugated,-medroxyprogesterone (Prempro) 0 3-1 5 MG per tablet Take 1 tablet by mouth daily (Patient not taking: Reported on 11/11/2022)   • Zinc 50 MG TABS Take 1 tablet by mouth daily (Patient not taking: Reported on 11/11/2022)   • [DISCONTINUED] Multiple Vitamin (MULTI-VITAMIN DAILY) TABS Take by mouth (Patient not taking: No sig reported)   • [DISCONTINUED] mupirocin (BACTROBAN) 2 % ointment Apply topically 2 (two) times a day (Patient not taking: No sig reported)       Objective     /72 (BP Location: Left arm, Patient Position: Sitting, Cuff Size: Adult)   Pulse 79   Temp (!) 96 °F (35 6 °C) (Temporal)   Ht 5' 8" (1 727 m) Comment: Pt reported, refused height  Wt 59 kg (130 lb)   SpO2 99%   BMI 19 77 kg/m²     Physical Exam  Constitutional:       Appearance: She is well-developed  HENT:      Head: Normocephalic and atraumatic     Eyes:      Conjunctiva/sclera: Conjunctivae normal       Pupils: Pupils are equal, round, and reactive to light  Cardiovascular:      Rate and Rhythm: Normal rate and regular rhythm  Heart sounds: Normal heart sounds  Pulmonary:      Effort: Pulmonary effort is normal       Breath sounds: Normal breath sounds  Musculoskeletal:         General: Normal range of motion  Cervical back: Normal range of motion and neck supple  Skin:     General: Skin is warm and dry  Neurological:      Mental Status: She is alert and oriented to person, place, and time  Deep Tendon Reflexes: Reflexes are normal and symmetric     Psychiatric:         Behavior: Behavior normal        Rometta Brain, DO

## 2022-11-29 ENCOUNTER — TELEPHONE (OUTPATIENT)
Dept: DERMATOLOGY | Facility: CLINIC | Age: 62
End: 2022-11-29

## 2022-11-29 NOTE — TELEPHONE ENCOUNTER
Spoke to patient  Patient not doing well on oral minoxidil  Discussed PRP with patient  Patient is agreeable to considering PRP  (discussed prices)  Love Gutierrez: Please cancel my appointment with her and set her up with a consultation with PRP doctor  Thank you!

## 2022-11-30 NOTE — TELEPHONE ENCOUNTER
Thank you for contacting pt to discuss her issues  Pt is scheduled for 1/11/23 for consultation/initial PRP treatment with Dr Lily Mejia; pt to get scheduled out for the next 2 treatments when Dr Lily Mejia' schedule opens w/in next 2 wks

## 2022-12-07 DIAGNOSIS — E06.3 HASHIMOTO'S DISEASE: ICD-10-CM

## 2022-12-07 DIAGNOSIS — G47.00 INSOMNIA, UNSPECIFIED TYPE: ICD-10-CM

## 2022-12-07 DIAGNOSIS — G50.0 TRIGEMINAL NEURALGIA: ICD-10-CM

## 2022-12-07 DIAGNOSIS — F32.0 MAJOR DEPRESSIVE DISORDER, SINGLE EPISODE, MILD (HCC): ICD-10-CM

## 2022-12-07 DIAGNOSIS — Z78.0 MENOPAUSE: ICD-10-CM

## 2022-12-07 RX ORDER — ZOLPIDEM TARTRATE 10 MG/1
10 TABLET ORAL
Qty: 90 TABLET | Refills: 0 | Status: SHIPPED | OUTPATIENT
Start: 2022-12-07

## 2022-12-07 RX ORDER — ESTROGEN,CON/M-PROGEST ACET 0.3-1.5MG
1 TABLET ORAL DAILY
Qty: 84 TABLET | Refills: 2 | Status: SHIPPED | OUTPATIENT
Start: 2022-12-07

## 2022-12-07 RX ORDER — PREGABALIN 50 MG/1
50 CAPSULE ORAL 2 TIMES DAILY
Qty: 180 CAPSULE | Refills: 1 | Status: SHIPPED | OUTPATIENT
Start: 2022-12-07

## 2022-12-07 RX ORDER — LEVOTHYROXINE SODIUM 0.05 MG/1
50 TABLET ORAL DAILY
Qty: 90 TABLET | Refills: 3 | Status: SHIPPED | OUTPATIENT
Start: 2022-12-07

## 2022-12-07 RX ORDER — AMITRIPTYLINE HYDROCHLORIDE 10 MG/1
20 TABLET, FILM COATED ORAL
Qty: 180 TABLET | Refills: 3 | Status: SHIPPED | OUTPATIENT
Start: 2022-12-07

## 2023-01-13 ENCOUNTER — TELEPHONE (OUTPATIENT)
Dept: DERMATOLOGY | Facility: CLINIC | Age: 63
End: 2023-01-13

## 2023-01-13 NOTE — TELEPHONE ENCOUNTER
Called pt to see if she would be interested in coming in on Monday, 1/16 to start her PRP treatments with Dr Adi Crawford at [facility]  Pt currently scheduled to start with Dr Paola Bull on 2/27  LM for pt to call the office if she would like to r/s for Monday

## 2023-01-30 ENCOUNTER — TELEPHONE (OUTPATIENT)
Dept: FAMILY MEDICINE CLINIC | Facility: CLINIC | Age: 63
End: 2023-01-30

## 2023-01-30 DIAGNOSIS — B37.31 VAGINAL YEAST INFECTION: Primary | ICD-10-CM

## 2023-01-30 RX ORDER — FLUCONAZOLE 150 MG/1
150 TABLET ORAL ONCE
Qty: 1 TABLET | Refills: 0 | Status: SHIPPED | OUTPATIENT
Start: 2023-01-30 | End: 2023-01-30

## 2023-01-30 NOTE — TELEPHONE ENCOUNTER
Patient called back and she stated that she has vaginal candidiasis (yeast infection) and if you could send in the medication to [facility] in [place] for the patient

## 2023-01-30 NOTE — TELEPHONE ENCOUNTER
Patient called she asking if you can prescribe one week supply of diflucan? She stated she has taken in the past and OTC the meds do not help her

## 2023-02-09 DIAGNOSIS — Z86.19 HISTORY OF SHINGLES: ICD-10-CM

## 2023-02-09 RX ORDER — VALACYCLOVIR HYDROCHLORIDE 500 MG/1
TABLET, FILM COATED ORAL
Qty: 30 TABLET | Refills: 4 | Status: SHIPPED | OUTPATIENT
Start: 2023-02-09 | End: 2023-03-11

## 2023-02-20 ENCOUNTER — TELEPHONE (OUTPATIENT)
Dept: DERMATOLOGY | Facility: CLINIC | Age: 63
End: 2023-02-20

## 2023-02-20 NOTE — TELEPHONE ENCOUNTER
Spoke with [employer]  They are requesting the script be filled out by another provider within the practice  Insurance is rejecting filling it due to not recognizing DR Platt's NPI in their system

## 2023-02-20 NOTE — TELEPHONE ENCOUNTER
Patient called [facility] is saying they do not have the correct NPI number for Dr Alejandro Holbrook, but when asked what number, it was the correct one  They stated they cannot get her her medication valACYclovir (VALTREX) 500 mg tablet  If someone can please give a call so she is able to get her medication  Thank you!  [phone]

## 2023-02-20 NOTE — TELEPHONE ENCOUNTER
Left  for patient notifying her that Dr Flaco Bro spoke with her pharmacy and she should now be able to  her prescription for Valtrex

## 2023-03-03 DIAGNOSIS — G47.00 INSOMNIA, UNSPECIFIED TYPE: ICD-10-CM

## 2023-03-03 RX ORDER — ZOLPIDEM TARTRATE 10 MG/1
10 TABLET ORAL
Qty: 90 TABLET | Refills: 0 | Status: SHIPPED | OUTPATIENT
Start: 2023-03-03

## 2023-04-25 NOTE — PROGRESS NOTES
ASSESSMENT & PLAN: Jesse Crowder was seen today for gynecologic exam     Diagnoses and all orders for this visit:    Encounter for routine gynecological examination with Papanicolaou smear of cervix  -     Liquid-based pap, screening        1  Routine well woman exam done today  2  Pap and HPV:  The patient's pap is not up to date  Pap and cotesting was done today  Current ASCCP Guidelines reviewed  3   Mammogram has been scheduled  4   Colorectal cancer screening is up to date  4  The following were reviewed in today's visit: adequate intake of calcium and vitamin D, exercise and healthy diet  5  F/u 1 year for next routine GYN exam   6  Urinary incontinence: Patient given contact information for pelvic floor physical therapy  CC:  Annual Gynecologic Examination    HPI: Oxana Garcia is a 60 y o  who presents for annual gynecologic examination  She has the following concerns:  No gyn concerns  Pt reports urine leakage, wears panty liner  Occurs anytime  Had traumatic brain injury 2015 has mirgaine headaches, trigeminal neuralgia, short term memory loss and switches between depression and anxiety  Sees neuropsychiatrist   Prior she was director of [employer]  Went back to work PT and then it got to a point where she had to retire  Also has Variable immune deficiency and very prone to sinus infections  Has been volunteering  Health Maintenance:    Patient describes her health as fair to good  Patient has weight concerns  Had lost 20 lbs over past couple of years; only wanted to lose 10 lbs  Unsure why  She has limited exercise  She does wear her seatbelt routinely  She does perform irregular monthly self breast exams  She does feel safe at home  Patients does follow a gluten free and low sugar diet  Patient gets 1 servings of dairy or calcium rich foods a day  Last pap: 2014 nl pap  Last mammogram: 8-9 years ago, next scheduled today    Last colorectal cancer screenin colonoscopy    Patient Active Problem List   Diagnosis   • Traumatic brain injury ([facility] [address] )   • Common variable immunodeficiency ([facility] [address] )   • Hashimoto's disease   • Migraine without aura or status migrainosus   • Trigeminal neuralgia   • Gastroesophageal reflux disease without esophagitis   • Vitamin D deficiency   • ADHD   • Acute bronchitis due to other specified organisms   • Insomnia   • Moderate episode of recurrent major depressive disorder (HCC)   • Cellulitis of lower extremity   • Eyelid abnormality   • Mild intermittent asthma without complication   • Major depressive disorder, single episode, mild ([facility] [address] )   • Vaginal yeast infection   • Itchy eyes   • Encounter for screening mammogram for malignant neoplasm of breast   • Urinary incontinence       Past Medical History:   Diagnosis Date   • ADHD     last assessed: 2016   • Allergic    • Anxiety    • Depression    • Disease of thyroid gland    • GERD with esophagitis     last assessed: 5/10/2018   • Hashimoto's thyroiditis     last assessed: 2016   • Herpesviral vesicular dermatitis     last assessed: 2018   • Hyperlipidemia    • IBS (irritable bowel syndrome)     last assessed: 2016   • Memory loss    • Migraines     last assessed: 2016   • 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 [facility] )     last assessed: 5/10/2018   • Traumatic brain injury [facility])     last assessed: 2016   • Trigeminal neuralgia     last assessed: 2016       Past Surgical History:   Procedure Laterality Date   • LYMPH NODE BIOPSY     • ORBITAL RIM RECONSTRUCTION Right    • TONSILLECTOMY Bilateral    • WISDOM TOOTH EXTRACTION         Past OB/Gyn History:    History of abnormal pap smears: No    Patient is not currently sexually active  heterosexual  Patient's family planning method is post menopausal status      Family History   Problem Relation Age of Onset   • Heart disease Mother    • Heart disease Father    • No Known Problems Sister    • No Known Problems Sister    • No Known Problems Maternal Grandmother    • No Known Problems Maternal Grandfather    • No Known Problems Paternal Grandmother    • No Known Problems Paternal Grandfather    • No Known Problems Maternal Aunt    • No Known Problems Maternal Aunt    • No Known Problems Paternal Aunt        Social History:  Social History     Socioeconomic History   • Marital status: Single     Spouse name: Not on file   • Number of children: Not on file   • Years of education: Not on file   • Highest education level: Not on file   Occupational History   • Not on file   Tobacco Use   • Smoking status: Former     Packs/day: 0 50     Years: 23 00     Pack years: 11 50     Types: Cigarettes     Start date:      Quit date:      Years since quittin 3   • Smokeless tobacco: Never   Vaping Use   • Vaping Use: Never used   Substance and Sexual Activity   • Alcohol use: Never     Comment: Denies use   • Drug use: Never     Comment: Denies use   • Sexual activity: Not Currently   Other Topics Concern   • Not on file   Social History Narrative   • Not on file     Social Determinants of Health     Financial Resource Strain: Not on file   Food Insecurity: Not on file   Transportation Needs: Not on file   Physical Activity: Not on file   Stress: Not on file   Social Connections: Not on file   Intimate Partner Violence: Not on file   Housing Stability: Not on file     Presently lives alone  Patient is currently retired, volunteers      Allergies   Allergen Reactions   • Cleocin  [Clindamycin Hcl] GI Intolerance   • Clindamycin GI Intolerance and Vomiting     Reaction Date: ;          Current Outpatient Medications:   •  acetaminophen (TYLENOL) 325 mg tablet, Take 650 mg by mouth daily at bedtime , Disp: , Rfl:   •  amitriptyline (ELAVIL) 10 mg tablet, Take 2 tablets (20 mg total) by mouth daily at bedtime, Disp: 180 tablet, Rfl: 3  •  cholecalciferol (VITAMIN D3) 1,000 units tablet, Take 2,000 Units by mouth daily , Disp: , Rfl:   •  Cimetidine (TAGAMET PO), Take 1 tablet by mouth as needed (gerd), Disp: , Rfl:   •  clobetasol (OLUX) 0 05 % topical foam, Apply on scalp for itchy rash, Disp: 50 g, Rfl: 2  •  estrogen, conjugated,-medroxyprogesterone (Prempro) 0 3-1 5 MG per tablet, Take 1 tablet by mouth daily, Disp: 84 tablet, Rfl: 2  •  levothyroxine (Synthroid) 50 mcg tablet, Take 1 tablet (50 mcg total) by mouth daily, Disp: 90 tablet, Rfl: 3  •  prednisoLONE acetate (PRED FORTE) 1 % ophthalmic suspension, Administer 1 drop to both eyes 3 (three) times a day, Disp: 5 mL, Rfl: 2  •  pregabalin (LYRICA) 50 mg capsule, Take 1 capsule (50 mg total) by mouth 2 (two) times a day, Disp: 180 capsule, Rfl: 1  •  rizatriptan (MAXALT) 5 MG tablet, Take 5 mg by mouth as needed , Disp: , Rfl:   •  sertraline (ZOLOFT) 50 mg tablet, Take 2 tablets (100 mg total) by mouth daily at bedtime, Disp: 90 tablet, Rfl: 2  •  valACYclovir (VALTREX) 500 mg tablet, Take 1 tablet by mouth once daily, Disp: 30 tablet, Rfl: 4  •  Zinc 50 MG TABS, Take 1 tablet by mouth daily, Disp: , Rfl:   •  zolpidem (AMBIEN) 10 mg tablet, Take 1 tablet (10 mg total) by mouth daily at bedtime as needed for sleep, Disp: 90 tablet, Rfl: 0  •  b complex vitamins tablet, Take 1 tablet by mouth 2 (two) times a week (Patient not taking: Reported on 9/29/2022), Disp: , Rfl:   •  cyanocobalamin (VITAMIN B-12) 100 mcg tablet, Take 100 mcg by mouth 2 (two) times a week  (Patient not taking: Reported on 9/29/2022), Disp: , Rfl:   •  minoxidil (LONITEN) 2 5 mg tablet, Take 0 5 tablets (1 25 mg total) by mouth daily (Patient not taking: Reported on 4/26/2023), Disp: 15 tablet, Rfl: 3  •  Omega-3 Fatty Acids (FISH OIL PO), Take 1 capsule by mouth 2 (two) times a week (Patient not taking: Reported on 4/26/2023), Disp: , Rfl:   •  Pren-Fe-Meth-FA-Omeg w/o A (PrimaCare) 30-1-470 MG CAPS, Take by mouth, Disp: , Rfl:     Review of Systems  Constitutional :no "fever, feels well, no tiredness, recent weight loss  ENT: no ear ache, no loss of hearing, no nosebleeds or nasal discharge, no sore throat or hoarseness  Cardiovascular: no complaints of slow or fast heart beat, no chest pain, no palpitations, no leg claudication or lower extremity edema  Respiratory: no complaints of shortness of shortness of breath, no SIDDIQUI  Breasts:no complaints of breast pain, breast lump, or nipple discharge  Gastrointestinal: no complaints of abdominal pain, constipation, nausea, vomiting, or diarrhea or bloody stools  Genitourinary : no complaints of dysuria, incontinence, pelvic pain, no dysmenorrhea, vaginal discharge or abnormal vaginal bleeding and as noted in HPI  Musculoskeletal: no complaints of arthralgia, no myalgia, no joint swelling or stiffness, no limb pain or swelling  Integumentary: no complaints of skin rash or lesion, itching or dry skin  Neurological: no complaints of headache, no confusion, no numbness or tingling, no dizziness or fainting    Physical Exam:     Ht 5' 8\" (1 727 m)   Wt 60 kg (132 lb 4 8 oz)   BMI 20 12 kg/m²     General: appears stated age, cooperative, alert normal mood and affect   Psychiatric oriented to person, place and time  Mood and affect normal   Neck: normal, supple,trachea midline, no masses  Thyroid: normal, no thyromegaly   Heart: regular rate and rhythm, S1, S2 normal, no murmur, click, rub or gallop   Lungs: clear to auscultation bilaterally, no increased work of breathing or signs of respiratory distress   Breasts: normal, no dimpling or skin changes noted  Moderate fibrocystic changes throughout bilaterally     Abdomen: soft, non-tender, without masses or organomegaly   Vulva: normal , no lesions   Vagina: normal , no lesions, moderate atrophy   Urethra: normal   Urethal meatus normal   Bladder Normal, soft, non-tender and no prolapse or masses appreciated   Cervix: normal, no palpable masses    Uterus: normal , non-tender, not " enlarged, no palpable masses   Adnexa: normal, non-tender without fullness or masses   Lymphatic Palpation of lymph nodes in neck, axilla, groin and/or other locations: no lymphadenopathy or masses noted   Skin Normal skin turgor and no rashes    Palpation of skin and subcutaneous tissue normal

## 2023-04-26 ENCOUNTER — OFFICE VISIT (OUTPATIENT)
Dept: OBGYN CLINIC | Facility: MEDICAL CENTER | Age: 63
End: 2023-04-26

## 2023-04-26 ENCOUNTER — HOSPITAL ENCOUNTER (OUTPATIENT)
Dept: MAMMOGRAPHY | Facility: MEDICAL CENTER | Age: 63
Discharge: HOME | End: 2023-04-26

## 2023-04-26 VITALS — BODY MASS INDEX: 20.05 KG/M2 | WEIGHT: 132.28 LBS | HEIGHT: 68 IN

## 2023-04-26 VITALS — BODY MASS INDEX: 20.05 KG/M2 | HEIGHT: 68 IN | WEIGHT: 132.3 LBS

## 2023-04-26 DIAGNOSIS — Z12.31 ENCOUNTER FOR SCREENING MAMMOGRAM FOR BREAST CANCER: ICD-10-CM

## 2023-04-26 DIAGNOSIS — Z01.419 ENCOUNTER FOR ROUTINE GYNECOLOGICAL EXAMINATION WITH PAPANICOLAOU SMEAR OF CERVIX: Primary | ICD-10-CM

## 2023-04-26 NOTE — PATIENT INSTRUCTIONS
Thank you for your confidence in our team    We appreciate you and welcome your feedback  If you receive a survey from us, please take a few moments to let us know how we are doing     Sincerely,   Al Duran MD

## 2023-04-27 ENCOUNTER — OFFICE VISIT (OUTPATIENT)
Dept: DERMATOLOGY | Facility: CLINIC | Age: 63
End: 2023-04-27

## 2023-04-27 VITALS — TEMPERATURE: 98.2 F | WEIGHT: 132 LBS | BODY MASS INDEX: 20 KG/M2 | HEIGHT: 68 IN

## 2023-04-27 DIAGNOSIS — D18.01 CHERRY ANGIOMA: ICD-10-CM

## 2023-04-27 DIAGNOSIS — L65.9 HAIR LOSS: Primary | ICD-10-CM

## 2023-04-27 DIAGNOSIS — D22.9 MULTIPLE NEVI: ICD-10-CM

## 2023-04-27 DIAGNOSIS — L21.9 SEBORRHEIC DERMATITIS: ICD-10-CM

## 2023-04-27 RX ORDER — CLOBETASOL PROPIONATE 0.5 MG/G
AEROSOL, FOAM TOPICAL
Qty: 50 G | Refills: 4 | Status: SHIPPED | OUTPATIENT
Start: 2023-04-27

## 2023-04-27 RX ORDER — KETOCONAZOLE 20 MG/ML
SHAMPOO TOPICAL
Qty: 100 ML | Refills: 11 | Status: SHIPPED | OUTPATIENT
Start: 2023-04-27

## 2023-04-27 RX ORDER — LORATADINE 10 MG/1
10 TABLET ORAL DAILY
COMMUNITY

## 2023-04-27 RX ORDER — AZELASTINE 1 MG/ML
1 SPRAY, METERED NASAL 2 TIMES DAILY
COMMUNITY

## 2023-04-27 NOTE — PATIENT INSTRUCTIONS
HAIR LOSS: AGA + SEBORRHEIC DERMATITIS     Assessment and Plan:  Based on a thorough discussion of this condition and the management approach to it (including a comprehensive discussion of the known risks, side effects and potential benefits of treatment), the patient (family) agrees to implement the following specific plan:  Start nutrafol supplement  Start cobetasol foam daily to scalp as needed for itch  Stop minoxidil pill (pt has been having hair growth in areas beyond scalp even when not on minoxidil so minoxidil could worsen that)  Start ketoconazole shampoo 2x/week  Will make referral for PRP

## 2023-04-27 NOTE — PROGRESS NOTES
"[facility] Note     Patient Name: Dom Boyd  Encounter Date: 04/27/23     Have you been cared for by a [facility] [address] [facility] in the last 3 years and, if so, which description applies to you? Yes  I have been here within the last 3 years, and my medical history has NOT changed since that time  I am FEMALE/of child-bearing potential     REVIEW OF SYSTEMS:  Have you recently had or currently have any of the following? · No changes in my recent health  PAST MEDICAL HISTORY:  Have you personally ever had or currently have any of the following? If \"YES,\" then please provide more detail  · No changes in my medical history  FAMILY HISTORY:  Any \"first degree relatives\" (parent, brother, sister, or child) with the following? • No changes in my family's known health  PATIENT EXPERIENCE:    • Do you want the Dermatologist to perform a COMPLETE skin exam today including a clinical examination under the \"bra and underwear\" areas? NO  • If necessary, do we have your permission to call and leave a detailed message on your Preferred Phone number that includes your specific medical information?   Yes      Allergies   Allergen Reactions   • Cleocin  [Clindamycin Hcl] GI Intolerance   • Clindamycin GI Intolerance and Vomiting     Reaction Date: [id number];       Current Outpatient Medications:   •  acetaminophen (TYLENOL) 325 mg tablet, Take 650 mg by mouth daily at bedtime , Disp: , Rfl:   •  amitriptyline (ELAVIL) 10 mg tablet, Take 2 tablets (20 mg total) by mouth daily at bedtime, Disp: 180 tablet, Rfl: 3  •  azelastine (ASTELIN) 0 1 % nasal spray, 1 spray into each nostril 2 (two) times a day Use in each nostril as directed, Disp: , Rfl:   •  cholecalciferol (VITAMIN D3) 1,000 units tablet, Take 2,000 Units by mouth daily , Disp: , Rfl:   •  Cimetidine (TAGAMET PO), Take 1 tablet by mouth as needed (gerd), Disp: , Rfl:   •  clobetasol (OLUX) 0 05 % topical foam, Apply on scalp for itchy " rash, Disp: 50 g, Rfl: 2  •  estrogen, conjugated,-medroxyprogesterone (Prempro) 0 3-1 5 MG per tablet, Take 1 tablet by mouth daily, Disp: 84 tablet, Rfl: 2  •  levothyroxine (Synthroid) 50 mcg tablet, Take 1 tablet (50 mcg total) by mouth daily, Disp: 90 tablet, Rfl: 3  •  loratadine (CLARITIN) 10 mg tablet, Take 10 mg by mouth daily, Disp: , Rfl:   •  prednisoLONE acetate (PRED FORTE) 1 % ophthalmic suspension, Administer 1 drop to both eyes 3 (three) times a day, Disp: 5 mL, Rfl: 2  •  pregabalin (LYRICA) 50 mg capsule, Take 1 capsule (50 mg total) by mouth 2 (two) times a day, Disp: 180 capsule, Rfl: 1  •  Pren-Fe-Meth-FA-Omeg w/o A (PrimaCare) 30-1-470 MG CAPS, Take by mouth, Disp: , Rfl:   •  rizatriptan (MAXALT) 5 MG tablet, Take 5 mg by mouth as needed , Disp: , Rfl:   •  sertraline (ZOLOFT) 50 mg tablet, Take 2 tablets (100 mg total) by mouth daily at bedtime, Disp: 90 tablet, Rfl: 2  •  Zinc 50 MG TABS, Take 1 tablet by mouth daily, Disp: , Rfl:   •  zolpidem (AMBIEN) 10 mg tablet, Take 1 tablet (10 mg total) by mouth daily at bedtime as needed for sleep, Disp: 90 tablet, Rfl: 0  •  b complex vitamins tablet, Take 1 tablet by mouth 2 (two) times a week (Patient not taking: Reported on 9/29/2022), Disp: , Rfl:   •  cyanocobalamin (VITAMIN B-12) 100 mcg tablet, Take 100 mcg by mouth 2 (two) times a week  (Patient not taking: Reported on 9/29/2022), Disp: , Rfl:   •  minoxidil (LONITEN) 2 5 mg tablet, Take 0 5 tablets (1 25 mg total) by mouth daily (Patient not taking: Reported on 4/26/2023), Disp: 15 tablet, Rfl: 3  •  Omega-3 Fatty Acids (FISH OIL PO), Take 1 capsule by mouth 2 (two) times a week (Patient not taking: Reported on 4/26/2023), Disp: , Rfl:   •  valACYclovir (VALTREX) 500 mg tablet, Take 1 tablet by mouth once daily, Disp: 30 tablet, Rfl: 4          • Whom besides the patient is providing clinical information about today's encounter?   o NO ADDITIONAL HISTORIAN (patient alone provided "history)    Physical Exam and Assessment/Plan by Diagnosis:    Patient is here for follow up for hair loss  Patient was taking the Minoxidil was having hair shedding extending beyond 6 weeks  Patient also has itchiness in the middle of back and has been happening for 2 to 3 months  HAIR LOSS: AGA + SEBORRHEIC DERMATITIS   Physical Exam:  • Anatomic Location Affected:  scalp  • Morphological Description:  Increased variability of hair diameters, no scale  • Pertinent Positives:  • Pertinent Negatives:     Additional History of Present Condition:  Labs done: TSH, ferritin, vitamin D wnl  Pt reports shedding with oral minoxidil that lasted greater than 6 weeks  Experienced hair growth recently  Also complains of hair growth in areas NOT on scalp that is unwanted (but off minoxidil for months)  Assessment and Plan:  Based on a thorough discussion of this condition and the management approach to it (including a comprehensive discussion of the known risks, side effects and potential benefits of treatment), the patient (family) agrees to implement the following specific plan:  • Start nutrafol supplement  • Start cobetasol foam daily to scalp as needed for itch  • Stop minoxidil pill (pt has been having hair growth in areas beyond scalp even when not on minoxidil for months; minoxidil could worsen that)  • Start ketoconazole shampoo 2x/week  • Will make referral for PRP    MELANOCYTIC NEVI (\"Moles\")    Physical Exam:  • Anatomic Location Affected:   Mostly on sun-exposed areas of the back  • Morphological Description:  Scattered, 1-4mm round to ovoid, symmetrical-appearing, even bordered, skin colored to dark brown macules/papules, mostly in sun-exposed areas  • Pertinent Positives:  • Pertinent Negatives:     Additional History of Present Condition:  Present on Exam    Assessment and Plan:  Based on a thorough discussion of this condition and the management approach to it (including a comprehensive discussion of the " "known risks, side effects and potential benefits of treatment), the patient (family) agrees to implement the following specific plan:  • Reassured benign      Melanocytic Nevi  Melanocytic nevi (\"moles\") are tan or brown, raised or flat areas of the skin which have an increased number of melanocytes  Melanocytes are the cells in our body which make pigment and account for skin color  Some moles are present at birth (I e , \"congenital nevi\"), while others come up later in life (i e , \"acquired nevi\")  The sun can stimulate the body to make more moles  Sunburns are not the only thing that triggers more moles  Chronic sun exposure can do it too  Clinically distinguishing a healthy mole from melanoma may be difficult, even for experienced dermatologists  The \"ABCDE's\" of moles have been suggested as a means of helping to alert a person to a suspicious mole and the possible increased risk of melanoma  The suggestions for raising alert are as follows:    Asymmetry: Healthy moles tend to be symmetric, while melanomas are often asymmetric  Asymmetry means if you draw a line through the mole, the two halves do not match in color, size, shape, or surface texture  Asymmetry can be a result of rapid enlargement of a mole, the development of a raised area on a previously flat lesion, scaling, ulceration, bleeding or scabbing within the mole  Any mole that starts to demonstrate \"asymmetry\" should be examined promptly by a board certified dermatologist      Border: Healthy moles tend to have discrete, even borders  The border of a melanoma often blends into the normal skin and does not sharply delineate the mole from normal skin  Any mole that starts to demonstrate \"uneven borders\" should be examined promptly by a board certified dermatologist      Color: Healthy moles tend to be one color throughout  Melanomas tend to be made up of different colors ranging from dark black, blue, white, or red    Any mole that " "demonstrates a color change should be examined promptly by a board certified dermatologist      Diameter: Healthy moles tend to be smaller than 0 6 cm in size; an exception are \"congenital nevi\" that can be larger  Melanomas tend to grow and can often be greater than 0 6 cm (1/4 of an inch, or the size of a pencil eraser)  This is only a guideline, and many normal moles may be larger than 0 6 cm without being unhealthy  Any mole that starts to change in size (small to bigger or bigger to smaller) should be examined promptly by a board certified dermatologist      Evolving: Healthy moles tend to \"stay the same  \"  Melanomas may often show signs of change or evolution such as a change in size, shape, color, or elevation  Any mole that starts to itch, bleed, crust, burn, hurt, or ulcerate or demonstrate a change or evolution should be examined promptly by a board certified dermatologist       Dysplastic Nevi  Dysplastic moles are moles that fit the ABCDE rules of melanoma but are not identified as melanomas when examined under the microscope  They may indicate an increased risk of melanoma in that person  If there is a family history of melanoma, most experts agree that the person may be at an increased risk for developing a melanoma  Experts still do not agree on what dysplastic moles mean in patients without a personal or family history of melanoma  Dysplastic moles are usually larger than common moles and have different colors within it with irregular borders  The appearance can be very similar to a melanoma  Biopsies of dysplastic moles may show abnormalities which are different from a regular mole  Melanoma  Malignant melanoma is a type of skin cancer that can be deadly if it spreads throughout the body  The incidence of melanoma in the [place] is growing faster than any other cancer   Melanoma usually grows near the surface of the skin for a period of time, and then begins to grow deeper into the " "skin  Once it grows deeper into the skin, the risk of spread to other organs greatly increases  Therefore, early detection and removal of a malignant melanoma may result in a better chance at a complete cure; removal after the tumor has spread may not be as effective, leading to worse clinical outcomes such as death  The true rate of nevus transformation into a melanoma is unknown  It has been estimated that the lifetime risk for any acquired melanocytic nevus on any 21year-old individual transforming into melanoma by age [de-identified] is 0 03% (1 in 3,164) for men and 0 009% (1 in 10,800) for women  The appearance of a \"new mole\" remains one of the most reliable methods for identifying a malignant melanoma  Occasionally, melanomas appear as rapidly growing, blue-black, dome-shaped bumps within a previous mole or previous area of normal skin  Other times, melanomas are suspected when a mole suddenly appears or changes  Itching, burning, or pain in a pigmented lesion should increase suspicion, but most patients with early melanoma have no skin discomfort whatsoever  Melanoma can occur anywhere on the skin, including areas that are difficult for self-examination  Many melanomas are first noticed by other family members  Suspicious-looking moles may be removed for microscopic examination  You may be able to prevent death from melanoma by doing two simple things:    1  Try to avoid unnecessary sun exposure and protect your skin when it is exposed to the sun  People who live near the equator, people who have intermittent exposures to large amounts of sun, and people who have had sunburns in childhood or adolescence have an increased risk for melanoma  Sun sense and vigilant sun protection may be keys to helping to prevent melanoma    We recommend wearing UPF-rated sun protective clothing and sunglasses whenever possible and applying a moisturizer-sunscreen combination product (SPF 50+) such as Neutrogena Daily " "Defense to sun exposed areas of skin at least three times a day  2  Have your moles regularly examined by a board certified dermatologist AND by yourself or a family member/friend at home  We recommend that you have your moles examined at least once a year by a board certified dermatologist   Use your birthday as an annual reminder to have your \"Birthday Suit\" (I e , your skin) examined; it is a nice birthday gift to yourself to know that your skin is healthy appearing! Additionally, at-home self examinations may be helpful for detecting a possible melanoma  Use the ABCDEs we discussed and check your moles once a month at home  CHERRY ANGIOMAS  - Relevant exam: Scattered over the trunk/extremities are red papules  - Exam and clinical history consistent with cherry angiomas  - Educated that these are benign  - Educated that removal is considered aesthetic and would incur a fee  - Patient does not wish to pursue removal at this time but will contact us should this change  Scribe Attestation    I,:  Caryl Hussein am acting as a scribe while in the presence of the attending physician :       I,:  Jennifer Duffy MD personally performed the services described in this documentation    as scribed in my presence  :             "

## 2023-04-28 LAB
ALBUMIN SERPL-MCNC: 4.6 G/DL (ref 3.8–4.8)
ALBUMIN/GLOB SERPL: 2.2 {RATIO} (ref 1.2–2.2)
ALP SERPL-CCNC: 64 IU/L (ref 44–121)
ALT SERPL-CCNC: 21 IU/L (ref 0–32)
AST SERPL-CCNC: 26 IU/L (ref 0–40)
BASOPHILS # BLD AUTO: 0 X10E3/UL (ref 0–0.2)
BASOPHILS NFR BLD AUTO: 1 %
BILIRUB SERPL-MCNC: 0.3 MG/DL (ref 0–1.2)
BUN SERPL-MCNC: 11 MG/DL (ref 8–27)
BUN/CREAT SERPL: 15 (ref 12–28)
CALCIUM SERPL-MCNC: 9.9 MG/DL (ref 8.7–10.3)
CHLORIDE SERPL-SCNC: 103 MMOL/L (ref 96–106)
CHOLEST SERPL-MCNC: 213 MG/DL (ref 100–199)
CO2 SERPL-SCNC: 24 MMOL/L (ref 20–29)
CREAT SERPL-MCNC: 0.73 MG/DL (ref 0.57–1)
EGFR: 92 ML/MIN/1.73
EOSINOPHIL # BLD AUTO: 0 X10E3/UL (ref 0–0.4)
EOSINOPHIL NFR BLD AUTO: 1 %
ERYTHROCYTE [DISTWIDTH] IN BLOOD BY AUTOMATED COUNT: 12.9 % (ref 11.7–15.4)
GLOBULIN SER-MCNC: 2.1 G/DL (ref 1.5–4.5)
GLUCOSE SERPL-MCNC: 87 MG/DL (ref 70–99)
HCT VFR BLD AUTO: 38.4 % (ref 34–46.6)
HDLC SERPL-MCNC: 97 MG/DL
HGB BLD-MCNC: 12.9 G/DL (ref 11.1–15.9)
HPV HR 12 DNA CVX QL NAA+PROBE: NEGATIVE
HPV16 DNA CVX QL NAA+PROBE: NEGATIVE
HPV18 DNA CVX QL NAA+PROBE: NEGATIVE
IMM GRANULOCYTES # BLD: 0 X10E3/UL (ref 0–0.1)
IMM GRANULOCYTES NFR BLD: 0 %
LDLC SERPL CALC-MCNC: 108 MG/DL (ref 0–99)
LYMPHOCYTES # BLD AUTO: 1.8 X10E3/UL (ref 0.7–3.1)
LYMPHOCYTES NFR BLD AUTO: 31 %
MCH RBC QN AUTO: 30.7 PG (ref 26.6–33)
MCHC RBC AUTO-ENTMCNC: 33.6 G/DL (ref 31.5–35.7)
MCV RBC AUTO: 91 FL (ref 79–97)
MONOCYTES # BLD AUTO: 0.4 X10E3/UL (ref 0.1–0.9)
MONOCYTES NFR BLD AUTO: 7 %
NEUTROPHILS # BLD AUTO: 3.4 X10E3/UL (ref 1.4–7)
NEUTROPHILS NFR BLD AUTO: 60 %
PLATELET # BLD AUTO: 215 X10E3/UL (ref 150–450)
POTASSIUM SERPL-SCNC: 4.9 MMOL/L (ref 3.5–5.2)
PROT SERPL-MCNC: 6.7 G/DL (ref 6–8.5)
RBC # BLD AUTO: 4.2 X10E6/UL (ref 3.77–5.28)
SODIUM SERPL-SCNC: 140 MMOL/L (ref 134–144)
T3FREE SERPL-MCNC: 2.6 PG/ML (ref 2–4.4)
TRIGL SERPL-MCNC: 42 MG/DL (ref 0–149)
TSH SERPL DL<=0.005 MIU/L-ACNC: 2.37 UIU/ML (ref 0.45–4.5)
WBC # BLD AUTO: 5.7 X10E3/UL (ref 3.4–10.8)

## 2023-05-03 LAB
LAB AP GYN PRIMARY INTERPRETATION: NORMAL
Lab: NORMAL

## 2023-05-11 ENCOUNTER — OFFICE VISIT (OUTPATIENT)
Dept: FAMILY MEDICINE CLINIC | Facility: CLINIC | Age: 63
End: 2023-05-11

## 2023-05-11 VITALS
TEMPERATURE: 97.2 F | HEIGHT: 68 IN | OXYGEN SATURATION: 99 % | BODY MASS INDEX: 20.22 KG/M2 | SYSTOLIC BLOOD PRESSURE: 130 MMHG | WEIGHT: 133.4 LBS | DIASTOLIC BLOOD PRESSURE: 82 MMHG | HEART RATE: 74 BPM

## 2023-05-11 DIAGNOSIS — E06.3 HASHIMOTO'S THYROIDITIS: ICD-10-CM

## 2023-05-11 DIAGNOSIS — E55.9 VITAMIN D DEFICIENCY: ICD-10-CM

## 2023-05-11 DIAGNOSIS — G43.009 MIGRAINE WITHOUT AURA AND WITHOUT STATUS MIGRAINOSUS, NOT INTRACTABLE: ICD-10-CM

## 2023-05-11 DIAGNOSIS — F32.0 MAJOR DEPRESSIVE DISORDER, SINGLE EPISODE, MILD (HCC): ICD-10-CM

## 2023-05-11 DIAGNOSIS — G47.00 INSOMNIA, UNSPECIFIED TYPE: Primary | ICD-10-CM

## 2023-05-11 RX ORDER — ZOLPIDEM TARTRATE 10 MG/1
10 TABLET ORAL
Qty: 90 TABLET | Refills: 0 | Status: SHIPPED | OUTPATIENT
Start: 2023-05-11

## 2023-05-11 NOTE — PROGRESS NOTES
Name: Donna Reeves      : 1960      MRN: [id number]  Encounter Provider: Ranulfo Luna DO  Encounter Date: 2023   Encounter department: [address] [place] [address]  Chief Complaint   Patient presents with   • Follow-up     Here for 6 month follow up and review of labs, mammogram, gyn visit  Patient Instructions   Continue same meds and recheck thyroid labs in 6 months  Get the additional testing for right breast as directed for increased right breast calcifications  Insomnia and migraine stable and also sees GYN as directed  Labs all stable  Rec also to use sunscreen and monitor for ticks  Patient also has better cholesterol as well  HDL is increased and also LDL decreased  Assessment & Plan     1  Insomnia, unspecified type  -     zolpidem (AMBIEN) 10 mg tablet; Take 1 tablet (10 mg total) by mouth daily at bedtime as needed for sleep    2  Major depressive disorder, single episode, mild (HCC)  Comments:  stable    3  Migraine without aura and without status migrainosus, not intractable    4  Vitamin D deficiency    5  Hashimoto's thyroiditis  -     TSH, 3rd generation; Future; Expected date: 2023  -     T4, free; Future; Expected date: 2023           Subjective      Follow-up (Here for 6 month follow up and review of labs, mammogram, gyn visit ) has further testing after mammogram done and is scheduled for further testing  Review of Systems   Constitutional: Negative  HENT: Negative  Eyes: Negative  Respiratory: Negative  Cardiovascular: Negative  Gastrointestinal: Negative  Endocrine: Negative  Genitourinary: Negative  Musculoskeletal: Negative  Skin: Negative  Allergic/Immunologic: Negative  Neurological: Negative  Hematological: Negative  Psychiatric/Behavioral: Negative          Current Outpatient Medications on File Prior to Visit   Medication Sig   • acetaminophen (TYLENOL) 325 mg tablet Take 650 mg by mouth daily at bedtime    • amitriptyline (ELAVIL) 10 mg tablet Take 2 tablets (20 mg total) by mouth daily at bedtime   • azelastine (ASTELIN) 0 1 % nasal spray 1 spray into each nostril 2 (two) times a day Use in each nostril as directed   • cholecalciferol (VITAMIN D3) 1,000 units tablet Take 2,000 Units by mouth daily    • Cimetidine (TAGAMET PO) Take 1 tablet by mouth as needed (gerd)   • clobetasol (OLUX) 0 05 % topical foam Apply on scalp for itchy rash   • estrogen, conjugated,-medroxyprogesterone (Prempro) 0 3-1 5 MG per tablet Take 1 tablet by mouth daily   • ketoconazole (NIZORAL) 2 % shampoo Apply and wash twice weekly on scalp   • levothyroxine (Synthroid) 50 mcg tablet Take 1 tablet (50 mcg total) by mouth daily   • loratadine (CLARITIN) 10 mg tablet Take 10 mg by mouth daily   • prednisoLONE acetate (PRED FORTE) 1 % ophthalmic suspension Administer 1 drop to both eyes 3 (three) times a day   • pregabalin (LYRICA) 50 mg capsule Take 1 capsule (50 mg total) by mouth 2 (two) times a day   • Pren-Fe-Meth-FA-Omeg w/o A (PrimaCare) 30-1-470 MG CAPS Take by mouth   • rizatriptan (MAXALT) 5 MG tablet Take 5 mg by mouth as needed    • sertraline (ZOLOFT) 50 mg tablet Take 2 tablets (100 mg total) by mouth daily at bedtime   • Zinc 50 MG TABS Take 1 tablet by mouth daily   • [DISCONTINUED] zolpidem (AMBIEN) 10 mg tablet Take 1 tablet (10 mg total) by mouth daily at bedtime as needed for sleep   • cyanocobalamin (VITAMIN B-12) 100 mcg tablet Take 100 mcg by mouth 2 (two) times a week  (Patient not taking: Reported on 9/29/2022)   • valACYclovir (VALTREX) 500 mg tablet Take 1 tablet by mouth once daily   • [DISCONTINUED] b complex vitamins tablet Take 1 tablet by mouth 2 (two) times a week (Patient not taking: Reported on 9/29/2022)   • [DISCONTINUED] minoxidil (LONITEN) 2 5 mg tablet Take 0 5 tablets (1 25 mg total) by mouth daily (Patient not taking: Reported on 4/26/2023)   • [DISCONTINUED] Omega-3 Fatty Acids (FISH OIL "PO) Take 1 capsule by mouth 2 (two) times a week (Patient not taking: Reported on 4/26/2023)       Objective     /82 (BP Location: Right arm, Patient Position: Sitting, Cuff Size: Standard)   Pulse 74   Temp (!) 97 2 °F (36 2 °C) (Temporal)   Ht 5' 8\" (1 727 m)   Wt 60 5 kg (133 lb 6 4 oz)   SpO2 99%   BMI 20 28 kg/m²     Physical Exam  Constitutional:       Appearance: She is well-developed  HENT:      Head: Normocephalic and atraumatic  Eyes:      Conjunctiva/sclera: Conjunctivae normal       Pupils: Pupils are equal, round, and reactive to light  Cardiovascular:      Rate and Rhythm: Normal rate and regular rhythm  Pulses: Normal pulses  Heart sounds: Normal heart sounds  Pulmonary:      Effort: Pulmonary effort is normal       Breath sounds: Normal breath sounds  Musculoskeletal:         General: Normal range of motion  Cervical back: Normal range of motion and neck supple  Skin:     General: Skin is warm and dry  Capillary Refill: Capillary refill takes less than 2 seconds  Neurological:      General: No focal deficit present  Mental Status: She is alert and oriented to person, place, and time  Mental status is at baseline  Deep Tendon Reflexes: Reflexes are normal and symmetric  Psychiatric:         Mood and Affect: Mood normal          Behavior: Behavior normal          Thought Content:  Thought content normal          Judgment: Judgment normal        Tobin Gusman, DO  "

## 2023-05-11 NOTE — PATIENT INSTRUCTIONS
Continue same meds and recheck thyroid labs in 6 months  Get the additional testing for right breast as directed for increased right breast calcifications  Insomnia and migraine stable and also sees GYN as directed  Labs all stable  Rec also to use sunscreen and monitor for ticks  Patient also has better cholesterol as well  HDL is increased and also LDL decreased

## 2023-06-12 ENCOUNTER — COSMETIC (OUTPATIENT)
Dept: DERMATOLOGY | Facility: CLINIC | Age: 63
End: 2023-06-12
Payer: COMMERCIAL

## 2023-06-12 ENCOUNTER — HOSPITAL ENCOUNTER (OUTPATIENT)
Dept: MAMMOGRAPHY | Facility: CLINIC | Age: 63
Discharge: HOME | End: 2023-06-12
Payer: COMMERCIAL

## 2023-06-12 VITALS — TEMPERATURE: 99.2 F | HEIGHT: 68 IN | BODY MASS INDEX: 20.25 KG/M2 | WEIGHT: 133.6 LBS

## 2023-06-12 VITALS — HEIGHT: 68 IN | WEIGHT: 133 LBS | BODY MASS INDEX: 20.16 KG/M2

## 2023-06-12 DIAGNOSIS — D48.5 NEOPLASM OF UNCERTAIN BEHAVIOR OF SKIN: ICD-10-CM

## 2023-06-12 DIAGNOSIS — L65.9 HAIR LOSS: Primary | ICD-10-CM

## 2023-06-12 DIAGNOSIS — R92.8 ABNORMAL SCREENING MAMMOGRAM: ICD-10-CM

## 2023-06-12 PROCEDURE — 11104 PUNCH BX SKIN SINGLE LESION: CPT | Performed by: STUDENT IN AN ORGANIZED HEALTH CARE EDUCATION/TRAINING PROGRAM

## 2023-06-12 PROCEDURE — 77065 DX MAMMO INCL CAD UNI: CPT

## 2023-06-12 PROCEDURE — 88305 TISSUE EXAM BY PATHOLOGIST: CPT | Performed by: STUDENT IN AN ORGANIZED HEALTH CARE EDUCATION/TRAINING PROGRAM

## 2023-06-12 PROCEDURE — 11105 PUNCH BX SKIN EA SEP/ADDL: CPT | Performed by: STUDENT IN AN ORGANIZED HEALTH CARE EDUCATION/TRAINING PROGRAM

## 2023-06-12 PROCEDURE — 88313 SPECIAL STAINS GROUP 2: CPT | Performed by: STUDENT IN AN ORGANIZED HEALTH CARE EDUCATION/TRAINING PROGRAM

## 2023-06-12 RX ORDER — MINOXIDIL 2.5 MG/1
1.25 TABLET ORAL DAILY
Qty: 45 TABLET | Refills: 0 | Status: SHIPPED | OUTPATIENT
Start: 2023-06-12 | End: 2023-09-10

## 2023-06-12 NOTE — PROGRESS NOTES
Met with patient and Jose R Vides  regarding recommendation for;    __x___ RIGHT ______LEFT      _____Ultrasound guided  x2______Stereotactic breast biopsy  __X___Verbalized understanding        Blood thinners:  No: _x____ Yes: ______ What:                 Biopsy teaching sheet given:  Yes: ___X___ No: ________    Pt given contact information and adv to call with any questions/needs

## 2023-06-12 NOTE — PROGRESS NOTES
"[place] [address] [facility] Note     Patient Name: Bonita Mclaughlin  Encounter Date: 6/12/2023    Have you been cared for by a [place] [address] Dermatologist in the last 3 years and, if so, which description applies to you? Yes  I have been here within the last 3 years, and my medical history has NOT changed since that time  I am FEMALE/of child-bearing potential     REVIEW OF SYSTEMS:  Have you recently had or currently have any of the following? · No changes in my recent health  PAST MEDICAL HISTORY:  Have you personally ever had or currently have any of the following? If \"YES,\" then please provide more detail  · No changes in my medical history  FAMILY HISTORY:  Any \"first degree relatives\" (parent, brother, sister, or child) with the following? • No changes in my family's known health  PATIENT EXPERIENCE:    • Do you want the Dermatologist to perform a COMPLETE skin exam today including a clinical examination under the \"bra and underwear\" areas? NO  • If necessary, do we have your permission to call and leave a detailed message on your Preferred Phone number that includes your specific medical information?   Yes      Allergies   Allergen Reactions   • Cleocin  [Clindamycin Hcl] GI Intolerance   • Clindamycin GI Intolerance and Vomiting     Reaction Date: [id number];       Current Outpatient Medications:   •  acetaminophen (TYLENOL) 325 mg tablet, Take 650 mg by mouth daily at bedtime , Disp: , Rfl:   •  amitriptyline (ELAVIL) 10 mg tablet, Take 2 tablets (20 mg total) by mouth daily at bedtime, Disp: 180 tablet, Rfl: 3  •  azelastine (ASTELIN) 0 1 % nasal spray, 1 spray into each nostril 2 (two) times a day Use in each nostril as directed, Disp: , Rfl:   •  cholecalciferol (VITAMIN D3) 1,000 units tablet, Take 2,000 Units by mouth daily , Disp: , Rfl:   •  Cimetidine (TAGAMET PO), Take 1 tablet by mouth as needed (gerd), Disp: , Rfl:   •  clobetasol (OLUX) 0 05 % topical foam, Apply on scalp for itchy " rash, Disp: 50 g, Rfl: 4  •  estrogen, conjugated,-medroxyprogesterone (Prempro) 0 3-1 5 MG per tablet, Take 1 tablet by mouth daily, Disp: 84 tablet, Rfl: 2  •  ketoconazole (NIZORAL) 2 % shampoo, Apply and wash twice weekly on scalp, Disp: 100 mL, Rfl: 11  •  levothyroxine (Synthroid) 50 mcg tablet, Take 1 tablet (50 mcg total) by mouth daily, Disp: 90 tablet, Rfl: 3  •  loratadine (CLARITIN) 10 mg tablet, Take 10 mg by mouth daily, Disp: , Rfl:   •  prednisoLONE acetate (PRED FORTE) 1 % ophthalmic suspension, Administer 1 drop to both eyes 3 (three) times a day, Disp: 5 mL, Rfl: 2  •  pregabalin (LYRICA) 50 mg capsule, Take 1 capsule (50 mg total) by mouth 2 (two) times a day, Disp: 180 capsule, Rfl: 1  •  rizatriptan (MAXALT) 5 MG tablet, Take 5 mg by mouth as needed , Disp: , Rfl:   •  sertraline (ZOLOFT) 50 mg tablet, Take 2 tablets (100 mg total) by mouth daily at bedtime, Disp: 90 tablet, Rfl: 2  •  zolpidem (AMBIEN) 10 mg tablet, Take 1 tablet (10 mg total) by mouth daily at bedtime as needed for sleep, Disp: 90 tablet, Rfl: 0  •  cyanocobalamin (VITAMIN B-12) 100 mcg tablet, Take 100 mcg by mouth 2 (two) times a week  (Patient not taking: Reported on 6/12/2023), Disp: , Rfl:   •  Pren-Fe-Meth-FA-Omeg w/o A (PrimaCare) 30-1-470 MG CAPS, Take by mouth (Patient not taking: Reported on 6/12/2023), Disp: , Rfl:   •  valACYclovir (VALTREX) 500 mg tablet, Take 1 tablet by mouth once daily, Disp: 30 tablet, Rfl: 4  •  Zinc 50 MG TABS, Take 1 tablet by mouth daily (Patient not taking: Reported on 6/12/2023), Disp: , Rfl:           • Whom besides the patient is providing clinical information about today's encounter?   o NO ADDITIONAL HISTORIAN (patient alone provided history)    Physical Exam and Assessment/Plan by Diagnosis:      FAVOR TELOGEN EFFLUVIUM WITH POSSIBLE OVERLYING SCARRING HAIR LOSS    Physical Exam:    Well appearing, in no acute distress  Well nourishes, well developed  Alert and oriented   A focused skin exam was performed including scalp and hair  The exam was negative except as noted below:     • Scalp:   o Positive hair pull test  o mild perifollicular erythema or scale on frontal scalp  o Follicular orifices in tact  o Hairline without recession   o Eyebrows and Eyelashes In tact  • Pertinent Positives:  • Pertinent Negatives: Additional History of Present Condition:  Patient states that she is experiencing hair loss for the last 3 years with pattern of increased sheddign for ~9 months out of the year and then regrowth of smaller and shorter hairs for a few months  She had blood work (Ferritin, Vitamin D25 hydroxy, TSH) in September 2022 ordered by Dr Gaviota Arroyo  She was on oral minoxidil for 2 months, but stopped bc was unsure of improvement  Then notes 1 mo after stopping she had hair growth in areas beyond scalp even when not on minoxidil for months - like her arms  Duration: For the last 3 years she noticed some shedding   Previous treatments: Rx Minoxidil 2 5 mg 1/2 tablet daily  Current treatments: Rx Clobetasol foam daily to scalp as needed for itch and Ketoconazole 2% shampoo twice a week  Shampoo frequency: ketoconazole QOD or every third day  Styling products: hair spray  Heat styling? Blow dry QOD or every third day  Hair color? Stopped 3 years ago  Using commercial dye, no used carlene Q4-5 months  Relaxers/Perms? no  Extensions/Weave? no  Premenopausal? Post menopausal  Family planning/pregnancy? no  Hx of breast cancer? None, but has to do core bx at end of the month bc increased calcium deposits sig increased in recent mammogram  Thyroid disease? Hashimoto's thyroiditis dx in 1997 but normal TSH  Anemia? no  Vitamin D Deficiency? No  Family history of hair loss?  no  Dietary Restrictions? Back when Hashimotos was dx'ed was diagnosed with Common Variable Immunodeficiency (IgE subtype) and does a gluten and sugar free diet since then  New medications added after hair loss?    Recent stressors: yes  Additional details? Assessment and Plan:  - History and physical consistent with telogen effluvium but given exam and itch, discussed c/f a scarring alopecia called FFA, would want to r/o with a punch biopsy  Pt agreeable  - Educated patient that telogen effluvium is a common non-scarring form of hair loss that leads to diffuse shedding   - Educated patient that telogen effluvium classically occurs in the setting of significant physical/psychological stress, illness, medication/hormonal changes  While hair loss is often noted weeks to months after a trigger, the trigger is often never identified   - Denies recent surgeries, serious illnesses, childbirth, vaccinations, protein or caloric malnutrition, medication additions, severe emotional distress  - History of thyroid disease but stable TSH  No hx of vitamin D deficiency or anemia  Labs all checked in 9/2022  - Set expectations that hair loss often continues for 6-12 months with then cessation of shedding and subsequent regrowth  Further educated that a small subset of patients will have chronic telogen effluvium but that this is far less common  Discussed PRP (aesthetic, not covered by insurance) as good option as well as oral minoxidil    - Recommend attempts to minimize any stressors  - Educated patient that minoxidil takes some months to see effect on hair- sounds like she was having good results from oral pill and if she feels comfortable, would restart  Restart Minoxidil 1 25mg by mouth daily  This is half a 2 5mg tablet  Please purchase a tablet cutter at the pharmacy when you  your medication  Oral minoxidil can have some side effects including:   Dizziness  We recommend starting this at night or bedtime for the first week for this reason to see how it affects you  Typically this will be seen early within the first month if it is going to occur    Other side effects may not appear until after the first month, or up to 3 months after starting the medication  These include  Hair thickening on the sideburn/forehead area where you have tiny thin hairs  This is reversible with stopping the minoxidil and does decrease with time  Rarely,swelling of the feet, sometimes hands  Very rarely, fluid around the heart (pericardial effusion)  We usually do not worry about this unless you have a heart disease history but like to mention it regardless  - Pending bx results, will re-discuss PRP   - Educated patient that the first sign of improvement is cessation of shedding followed by regrowth and that this may take many months  -   Will wait on bx for further plan for follow up  Procedure:  Skin Biopsy  After a thorough discussion of treatment options and risk/benefits/alternatives (including but not limited to local pain, scarring, dyspigmentation, blistering, possible superinfection, and inability to confirm a diagnosis via histopathology), verbal and written consent were obtained and portion of the rash was biopsied for tissue sample  See below for consent that was obtained from patient and subsequent Procedure Note  PROCEDURE NOTE:  PUNCH BIOPSY      Performing Physician: Dr Betts    Anatomic Location; Clinical Description with size (cm); Pre-Op Diagnosis:   o Specimen A: Right Frontal Scalp, ventral for H&E vertical sectioning ; Skin; Punch; 60year old female with increased shedding/hair loss 3 years with perifollicular Erythema, mild scale, itch; Differential Diagnosis: favor underlying telogen effluvium, r/o FFA  o Specimen B:  Right Frontal Scalp, dorsal for H&E horizontal sectioning ; Skin; Punch; 60year old female with increased shedding/hair loss 3 years with perifollicular Erythema, mild scale, itch; Differential Diagnosis: favor underlying telogen effluvium, r/o FFA       Anesthesia: 1% xylocaine with epi       Topical anesthesia: None       Indications: To indicate diagnosis and management plan      Procedure Details Patient informed of the risks (including bleeding,scaring and infection) and benefits of the procedure explained  Verbal and written informed consent obtained  The area was prepped and draped in the usual fashion  Anesthesia was obtained with 1% lidocaine with epinephrine  The skin was then stretched perpendicular to the skin tension lines and a punch biopsy to an appropriate sampling depth was obtained with a 4 mm punch with a forceps and iris scissors  Hemostasis was obtained with 4-0 Prolene x 3 sutures  Complications:  None      Specimen has been sent for review by Dermatopathology  Plan:  1  Instructed to keep the wound dry and covered for 24-48h and clean thereafter  2  Warning signs of infection were reviewed  3  Recommended that the patient use acetaminophen as needed for pain  4  Sutures if any should be removed in 14 days      Standard post-procedure care has been explained and has been included in written form within the patient's copy of Informed Consent      Scribe Attestation    I,:  Heidi Givens am acting as a scribe while in the presence of the attending physician :       I,:  Edmond Winter MD personally performed the services described in this documentation    as scribed in my presence :

## 2023-06-12 NOTE — PATIENT INSTRUCTIONS
Assessment and Plan:  History and physical consistent with telogen effluvium but given exam and itch, discussed c/f a scarring alopecia called FFA, would want to r/o with bx  Pt agreeable  Educated patient that telogen effluvium is a common non-scarring form of hair loss that leads to diffuse shedding  Educated patient that telogen effluvium classically occurs in the setting of significant physical/psychological stress, illness, medication/hormonal changes  While hair loss is often noted weeks to months after a trigger, the trigger is often never identified  Denies recent surgeries, serious illnesses, childbirth, vaccinations, protein or caloric malnutrition, medication additions, severe emotional distress  History of thyroid disease but stable TSH  No hx of vitamin D deficiency or anemia  Labs all checked in 9/2022  Set expectations that hair loss often continues for 6-12 months with then cessation of shedding and subsequent regrowth  Further educated that a small subset of patients will have chronic telogen effluvium but that this is far less common and not expected  Recommend attempts to minimize any stressors  Educated patient that minoxidil takes some months to see effect on hair- sounds like she was having good results from oral pill and if she feels comfortable, would restart  Restart Minoxidil 1 25mg by mouth daily  This is half a 2 5mg tablet  Please purchase a tablet cutter at the pharmacy when you  your medication  Oral minoxidil can have some side effects including:   Dizziness  We recommend starting this at night or bedtime for the first week for this reason to see how it affects you  Typically this will be seen early within the first month if it is going to occur  Other side effects may not appear until after the first month, or up to 3 months after starting the medication  These include  Hair thickening on the sideburn/forehead area where you have tiny thin hairs   This is reversible with stopping the minoxidil and does decrease with time  Rarely,swelling of the feet, sometimes hands  Very rarely, fluid around the heart (pericardial effusion)  We usually do not worry about this unless you have a heart disease history but like to mention it regardless  Educated patient that the first sign of improvement is cessation of shedding followed by regrowth and that this may take many months  Will see patient back in 6 mos to assess progress      Specimen has been sent for review by Dermatopathology  Plan:  1  Instructed to keep the wound dry and covered for 24-48h and clean thereafter  2  Warning signs of infection were reviewed  3  Recommended that the patient use acetaminophen as needed for pain  4  Sutures if any should be removed in 14 days      Standard post-procedure care has been explained and has been included in written form within the patient's copy of Informed Consent

## 2023-06-15 PROCEDURE — 88313 SPECIAL STAINS GROUP 2: CPT | Performed by: STUDENT IN AN ORGANIZED HEALTH CARE EDUCATION/TRAINING PROGRAM

## 2023-06-15 PROCEDURE — 88305 TISSUE EXAM BY PATHOLOGIST: CPT | Performed by: STUDENT IN AN ORGANIZED HEALTH CARE EDUCATION/TRAINING PROGRAM

## 2023-06-27 ENCOUNTER — OFFICE VISIT (OUTPATIENT)
Dept: DERMATOLOGY | Facility: CLINIC | Age: 63
End: 2023-06-27

## 2023-06-27 DIAGNOSIS — Z48.02 VISIT FOR SUTURE REMOVAL: Primary | ICD-10-CM

## 2023-06-27 PROCEDURE — RECHECK: Performed by: STUDENT IN AN ORGANIZED HEALTH CARE EDUCATION/TRAINING PROGRAM

## 2023-06-27 NOTE — Clinical Note
Patient was seen today for suture removal and asked if results were in yet  I told her I was going to send a message for you review

## 2023-06-27 NOTE — PROGRESS NOTES
Suture removal    Date/Time: 6/27/2023 11:00 AM    Performed by: Kerry Gee MA  Authorized by: Kaylan Melissa MD  Universal Protocol:  Procedure performed by:  Consent: Verbal consent obtained  Consent given by: patient  Patient understanding: patient states understanding of the procedure being performed  Patient consent: the patient's understanding of the procedure matches consent given  Procedure consent: procedure consent matches procedure scheduled  Relevant documents: relevant documents present and verified  Patient identity confirmed: verbally with patient        Patient location:  Clinic  Location:     Laterality:  Right    Location:  [address] location:  Forehead (Right frontal scalp)  Procedure details: Tools used:  Suture removal kit    Wound appearance:  No sign(s) of infection, good wound healing and clean    Sutures removed: 3  Post-procedure details:     Post-removal:  No dressing applied    Patient tolerance of procedure:   Tolerated well, no immediate complications      Scribe Attestation    I,:  Kerry Gee MA am acting as a scribe while in the presence of the attending physician :       I,:  Kaylan Melissa MD personally performed the services described in this documentation    as scribed in my presence :

## 2023-06-29 ENCOUNTER — HOSPITAL ENCOUNTER (OUTPATIENT)
Dept: MAMMOGRAPHY | Facility: CLINIC | Age: 63
Discharge: HOME | End: 2023-06-29
Payer: COMMERCIAL

## 2023-06-29 VITALS — DIASTOLIC BLOOD PRESSURE: 80 MMHG | SYSTOLIC BLOOD PRESSURE: 127 MMHG | HEART RATE: 84 BPM

## 2023-06-29 DIAGNOSIS — R92.8 ABNORMAL MAMMOGRAM: ICD-10-CM

## 2023-06-29 PROCEDURE — 19082 BX BREAST ADD LESION STRTCTC: CPT

## 2023-06-29 PROCEDURE — 19081 BX BREAST 1ST LESION STRTCTC: CPT

## 2023-06-29 PROCEDURE — 88341 IMHCHEM/IMCYTCHM EA ADD ANTB: CPT | Performed by: PATHOLOGY

## 2023-06-29 PROCEDURE — 88305 TISSUE EXAM BY PATHOLOGIST: CPT | Performed by: PATHOLOGY

## 2023-06-29 PROCEDURE — 88342 IMHCHEM/IMCYTCHM 1ST ANTB: CPT | Performed by: PATHOLOGY

## 2023-06-29 PROCEDURE — A4648 IMPLANTABLE TISSUE MARKER: HCPCS

## 2023-06-29 RX ORDER — LIDOCAINE HYDROCHLORIDE 10 MG/ML
5 INJECTION, SOLUTION EPIDURAL; INFILTRATION; INTRACAUDAL; PERINEURAL ONCE
Status: COMPLETED | OUTPATIENT
Start: 2023-06-29 | End: 2023-06-29

## 2023-06-29 RX ORDER — LIDOCAINE HYDROCHLORIDE AND EPINEPHRINE BITARTRATE 20; .01 MG/ML; MG/ML
10 INJECTION, SOLUTION SUBCUTANEOUS ONCE
Status: COMPLETED | OUTPATIENT
Start: 2023-06-29 | End: 2023-06-29

## 2023-06-29 RX ADMIN — LIDOCAINE HYDROCHLORIDE AND EPINEPHRINE 10 ML: 20; 10 INJECTION, SOLUTION INFILTRATION; PERINEURAL at 11:05

## 2023-06-29 RX ADMIN — LIDOCAINE HYDROCHLORIDE AND EPINEPHRINE 10 ML: 20; 10 INJECTION, SOLUTION INFILTRATION; PERINEURAL at 11:32

## 2023-06-29 RX ADMIN — LIDOCAINE HYDROCHLORIDE 5 ML: 10 INJECTION, SOLUTION EPIDURAL; INFILTRATION; INTRACAUDAL; PERINEURAL at 11:32

## 2023-06-29 RX ADMIN — LIDOCAINE HYDROCHLORIDE 5 ML: 10 INJECTION, SOLUTION EPIDURAL; INFILTRATION; INTRACAUDAL; PERINEURAL at 11:05

## 2023-06-29 NOTE — PROGRESS NOTES
Procedure type:    _____ultrasound guided __x___stereotactic    Breast:    _____Left __x___Right    Location: 10 oclock    Needle: 8g Revolve    # of passes: 7 cores (4 cores with calcs, 3 cores without calcs)    Clip: Mammomark Barbell    Performed by: Dr Raad Morelos held for 5 minutes by: Joan Nava    Steri Strips:    __x___yes _____no    Band aid:    _x____yes_____no    Tape and guaze:    _____yes _x____no    Tolerated procedure:    __x___yes _____no                  Procedure type:    _____ultrasound guided __x___stereotactic    Breast:    _____Left ___x__Right    Location: 12 oclock    Needle: 8g Revolve     # of passes: 8 cores (6 cores with calcs, 2 cores without calcs)    Clip: Mammomark Triple Twist    Performed by: Dr Raad Morelos held for 5 minutes by: Joan Nava    Steri Strips:    __x___yes _____no    Band aid:    __x___yes_____no    Tape and guaze:    _____yes _x____no    Tolerated procedure:    __x___yes _____no

## 2023-06-29 NOTE — PROGRESS NOTES
Ice pack given:    __x___yes _____no    Discharge instructions signed by patient:    ___x__yes _____no    Discharge instructions given to patient:    _x____yes _____no    Discharged via:    _x____ambulatory    _____wheelchair    _____stretcher    Stable on discharge:    __x___yes ____no

## 2023-06-30 ENCOUNTER — TELEPHONE (OUTPATIENT)
Dept: DERMATOLOGY | Facility: CLINIC | Age: 63
End: 2023-06-30

## 2023-06-30 NOTE — TELEPHONE ENCOUNTER
Rec'd call from patient stating that she had yet to receive a call regarding her biopsy results. Pathology has been finalized and verified by Dr. Abelardo Mcguire. Please contact patient with results AND next step regarding plan of care.

## 2023-06-30 NOTE — PROGRESS NOTES
Post procedure call completed 06/30/23 @ 0846    Bleeding: _____yes __X___no    Pain: _____yes ___X___no    Redness/Swelling: ______yes ___X___no    Band aid removed: _____yes ___X__no (discussed removing when she showers)    Steri-Strips intact: ___X___yes _____no (discussed with patient to remove steri strips on     if they have not come off on their own)    Pt with no questions at this time, adv will call when results available, adv to call with any questions or concerns, has name/# for contact

## 2023-07-05 ENCOUNTER — TELEPHONE (OUTPATIENT)
Dept: DERMATOLOGY | Facility: CLINIC | Age: 63
End: 2023-07-05

## 2023-07-05 DIAGNOSIS — Z86.19 HISTORY OF SHINGLES: ICD-10-CM

## 2023-07-05 DIAGNOSIS — L64.9 ANDROGENIC ALOPECIA, UNSPECIFIED: Primary | ICD-10-CM

## 2023-07-05 RX ORDER — VALACYCLOVIR HYDROCHLORIDE 500 MG/1
500 TABLET, FILM COATED ORAL DAILY
Qty: 30 TABLET | Refills: 4 | Status: SHIPPED | OUTPATIENT
Start: 2023-07-05 | End: 2023-12-02

## 2023-07-05 NOTE — TELEPHONE ENCOUNTER
Called pt to discuss bx results showing non scarring hair loss. Discussed cont minoxidil orally, pt tolerating well. Pt has pending breast bx that were done to r/o cancer. Discussed starting topical finasteride/minoxidil. Educated that topical formulations of finasteride  have been shown to be as effective as 1 mg PO finasteride daily in the literature (PMID: 56053072), with decreased risk of side effects and possible synergistic effects when used in combination with topical minoxidil. Discussed [facility] compounded medication with Finasteride/Minoxidil for $35. Pt agreeable to start. Rx sent    Further discussed PRP therapy, educating that a recent study demonstrated that approximately 71% of patients with androgenetic alopecia respond to PRP treatments and that treatment consists of monthly PRP injections for 6 months followed by maintenance treatments every 3-6 months. Pt would like to resdiscuss at next f/up in 6 months.

## 2023-07-13 ENCOUNTER — TELEPHONE (OUTPATIENT)
Dept: MAMMOGRAPHY | Facility: CLINIC | Age: 63
End: 2023-07-13

## 2023-07-18 ENCOUNTER — VBI (OUTPATIENT)
Dept: ADMINISTRATIVE | Facility: OTHER | Age: 63
End: 2023-07-18

## 2023-07-24 ENCOUNTER — TELEPHONE (OUTPATIENT)
Dept: MAMMOGRAPHY | Facility: CLINIC | Age: 63
End: 2023-07-24

## 2023-07-26 ENCOUNTER — OFFICE VISIT (OUTPATIENT)
Dept: FAMILY MEDICINE CLINIC | Facility: CLINIC | Age: 63
End: 2023-07-26
Payer: COMMERCIAL

## 2023-07-26 VITALS
RESPIRATION RATE: 16 BRPM | TEMPERATURE: 96.3 F | HEART RATE: 83 BPM | WEIGHT: 130.2 LBS | DIASTOLIC BLOOD PRESSURE: 70 MMHG | BODY MASS INDEX: 20.44 KG/M2 | SYSTOLIC BLOOD PRESSURE: 112 MMHG | OXYGEN SATURATION: 99 % | HEIGHT: 67 IN

## 2023-07-26 DIAGNOSIS — D83.9 COMMON VARIABLE IMMUNODEFICIENCY (HCC): ICD-10-CM

## 2023-07-26 DIAGNOSIS — N64.89 HEMATOMA OF RIGHT BREAST: Primary | ICD-10-CM

## 2023-07-26 DIAGNOSIS — R68.89 CHANGE IN WEIGHT: ICD-10-CM

## 2023-07-26 DIAGNOSIS — R53.1 WEAK: ICD-10-CM

## 2023-07-26 DIAGNOSIS — R29.898 WEAKNESS OF BOTH LEGS: ICD-10-CM

## 2023-07-26 DIAGNOSIS — E06.3 HASHIMOTO'S THYROIDITIS: ICD-10-CM

## 2023-07-26 PROCEDURE — 99214 OFFICE O/P EST MOD 30 MIN: CPT | Performed by: FAMILY MEDICINE

## 2023-07-26 NOTE — PATIENT INSTRUCTIONS
Here for evaluation of right breast biopsy site for right breast biopsy site June 29, 2023. Rec US of the area to r/o hematoma and also rec f-up as directed with RECOMMENDATION - Diagnostic Mammogram in 6 months for the right breast as directed. Check labs for hx of weakness in legs and general overall weakness with hx of common variable immunodeficiency and changes in weight. Recommend follow up with immunology specialist that patient has established care with for hx of common variable immunodeficiency.

## 2023-07-26 NOTE — PROGRESS NOTES
Name: Dang Glasgow      : 1960      MRN: [id number]  Encounter Provider: Concha Jimenes DO  Encounter Date: 2023   Encounter department: [address] [place] [address]  Chief Complaint   Patient presents with   • Follow-up     Pt would like to discuss R breast biopsy results ,possible hematoma also right arm pain after having mammogram done . Patient Instructions   Here for evaluation of right breast biopsy site for right breast biopsy site 2023. Rec US of the area to r/o hematoma and also rec f-up as directed with RECOMMENDATION - Diagnostic Mammogram in 6 months for the right breast as directed. Check labs for hx of weakness in legs and general overall weakness with hx of common variable immunodeficiency and changes in weight. Recommend follow up with immunology specialist that patient has established care with for hx of common variable immunodeficiency. Assessment & Plan     1. Hematoma of right breast  -     US breast right limited (diagnostic); Future; Expected date: 2023    2. Change in weight  -     Comprehensive metabolic panel; Future  -     CBC and differential; Future  -     Vitamin B12; Future  -     TSH, 3rd generation; Future  -     T4, free  -     Iron; Future  -     Ferritin; Future    3. Weak  -     Comprehensive metabolic panel; Future  -     CBC and differential; Future  -     Vitamin B12; Future  -     TSH, 3rd generation; Future  -     T4, free  -     Iron; Future  -     Ferritin; Future    4. Weakness of both legs  -     Comprehensive metabolic panel; Future  -     CBC and differential; Future  -     Vitamin B12; Future  -     TSH, 3rd generation; Future  -     T4, free  -     Iron; Future  -     Ferritin; Future    5. Common variable immunodeficiency ([address])  Comments:  sees specialist    Orders:  -     Comprehensive metabolic panel; Future  -     CBC and differential; Future  -     Vitamin B12; Future  -     TSH, 3rd generation;  Future  - T4, free  -     Iron; Future  -     Ferritin; Future    6. Hashimoto's thyroiditis  -     Comprehensive metabolic panel; Future  -     CBC and differential; Future  -     Vitamin B12; Future  -     TSH, 3rd generation; Future  -     T4, free  -     Iron; Future  -     Ferritin; Future           Subjective      Follow-up (Pt would like to discuss R breast biopsy results ,possible hematoma also right arm pain after having mammogram done .) Patient states no bleeding or swelling and bruising is resolved at biopsy site. Pain is resolved at biopsy sites. Patient feels tired and states she lost weight and feels she lost a lot of muscle tone. Review of Systems   Constitutional: Negative. HENT: Negative. Eyes: Negative. Respiratory: Negative. Cardiovascular: Negative. Gastrointestinal: Negative. Endocrine: Negative. Genitourinary: Negative. Musculoskeletal: Negative. Skin:        Follow-up (Pt would like to discuss R breast biopsy results ,possible hematoma also right arm pain after having mammogram done .)   Allergic/Immunologic: Negative. Neurological: Negative. Hematological: Negative. Psychiatric/Behavioral: Negative.         Current Outpatient Medications on File Prior to Visit   Medication Sig   • acetaminophen (TYLENOL) 325 mg tablet Take 650 mg by mouth daily at bedtime    • amitriptyline (ELAVIL) 10 mg tablet Take 2 tablets (20 mg total) by mouth daily at bedtime   • azelastine (ASTELIN) 0.1 % nasal spray 1 spray into each nostril 2 (two) times a day Use in each nostril as directed   • cholecalciferol (VITAMIN D3) 1,000 units tablet Take 2,000 Units by mouth daily    • Cimetidine (TAGAMET PO) Take 1 tablet by mouth as needed (gerd)   • clobetasol (OLUX) 0.05 % topical foam Apply on scalp for itchy rash   • estrogen, conjugated,-medroxyprogesterone (Prempro) 0.3-1.5 MG per tablet Take 1 tablet by mouth daily   • ketoconazole (NIZORAL) 2 % shampoo Apply and wash twice weekly on scalp   • levothyroxine (Synthroid) 50 mcg tablet Take 1 tablet (50 mcg total) by mouth daily   • loratadine (CLARITIN) 10 mg tablet Take 10 mg by mouth daily   • minoxidil (LONITEN) 2.5 mg tablet Take 0.5 tablets (1.25 mg total) by mouth daily   • minoxidil (ROGAINE) 2 % external solution Apply topically 2 (two) times a day   • prednisoLONE acetate (PRED FORTE) 1 % ophthalmic suspension Administer 1 drop to both eyes 3 (three) times a day   • pregabalin (LYRICA) 50 mg capsule Take 1 capsule (50 mg total) by mouth 2 (two) times a day   • rizatriptan (MAXALT) 5 MG tablet Take 5 mg by mouth as needed    • sertraline (ZOLOFT) 50 mg tablet Take 2 tablets (100 mg total) by mouth daily at bedtime   • valACYclovir (VALTREX) 500 mg tablet Take 1 tablet (500 mg total) by mouth daily   • zolpidem (AMBIEN) 10 mg tablet Take 1 tablet (10 mg total) by mouth daily at bedtime as needed for sleep   • cyanocobalamin (VITAMIN B-12) 100 mcg tablet Take 100 mcg by mouth 2 (two) times a week  (Patient not taking: Reported on 6/12/2023)   • Pren-Fe-Meth-FA-Omeg w/o A (PrimaCare) 30-1-470 MG CAPS Take by mouth (Patient not taking: Reported on 6/12/2023)   • Zinc 50 MG TABS Take 1 tablet by mouth daily (Patient not taking: Reported on 6/12/2023)       Objective     /70 (BP Location: Left arm, Patient Position: Sitting, Cuff Size: Adult)   Pulse 83   Temp (!) 96.3 °F (35.7 °C) (Temporal)   Resp 16   Ht 5' 7.2" (1.707 m)   Wt 59.1 kg (130 lb 3.2 oz)   SpO2 99%   BMI 20.27 kg/m²     Physical Exam  Constitutional:       Appearance: She is well-developed. Eyes:      Conjunctiva/sclera: Conjunctivae normal.      Pupils: Pupils are equal, round, and reactive to light. Cardiovascular:      Rate and Rhythm: Normal rate and regular rhythm. Heart sounds: Normal heart sounds. Pulmonary:      Effort: Pulmonary effort is normal.      Breath sounds: Normal breath sounds. Musculoskeletal:         General: Normal range of motion. Cervical back: Normal range of motion and neck supple. Skin:     General: Skin is warm and dry. Capillary Refill: Capillary refill takes less than 2 seconds. Comments: Right breast biopsy site is showing no bruising and is resolving and has some possible hematoma small at the biopsy site right breast   Neurological:      General: No focal deficit present. Mental Status: She is alert and oriented to person, place, and time. Deep Tendon Reflexes: Reflexes are normal and symmetric. Psychiatric:         Mood and Affect: Mood normal.         Behavior: Behavior normal.         Thought Content:  Thought content normal.         Judgment: Judgment normal.       Marquise Chandler,

## 2023-07-27 ENCOUNTER — TELEPHONE (OUTPATIENT)
Dept: MAMMOGRAPHY | Facility: CLINIC | Age: 63
End: 2023-07-27

## 2023-07-27 ENCOUNTER — HOSPITAL ENCOUNTER (OUTPATIENT)
Dept: ULTRASOUND IMAGING | Facility: HOSPITAL | Age: 63
Discharge: HOME | End: 2023-07-27
Payer: COMMERCIAL

## 2023-07-27 DIAGNOSIS — N64.89 HEMATOMA OF RIGHT BREAST: ICD-10-CM

## 2023-07-27 PROCEDURE — 76642 ULTRASOUND BREAST LIMITED: CPT

## 2023-08-01 LAB
ALBUMIN SERPL-MCNC: 4.8 G/DL (ref 3.9–4.9)
ALBUMIN/GLOB SERPL: 2.1 {RATIO} (ref 1.2–2.2)
ALP SERPL-CCNC: 63 IU/L (ref 44–121)
ALT SERPL-CCNC: 22 IU/L (ref 0–32)
AST SERPL-CCNC: 27 IU/L (ref 0–40)
BASOPHILS # BLD AUTO: 0 X10E3/UL (ref 0–0.2)
BASOPHILS NFR BLD AUTO: 1 %
BILIRUB SERPL-MCNC: 0.2 MG/DL (ref 0–1.2)
BUN SERPL-MCNC: 12 MG/DL (ref 8–27)
BUN/CREAT SERPL: 15 (ref 12–28)
CALCIUM SERPL-MCNC: 10.4 MG/DL (ref 8.7–10.3)
CHLORIDE SERPL-SCNC: 105 MMOL/L (ref 96–106)
CO2 SERPL-SCNC: 21 MMOL/L (ref 20–29)
CREAT SERPL-MCNC: 0.79 MG/DL (ref 0.57–1)
EGFR: 84 ML/MIN/1.73
EOSINOPHIL # BLD AUTO: 0.1 X10E3/UL (ref 0–0.4)
EOSINOPHIL NFR BLD AUTO: 1 %
ERYTHROCYTE [DISTWIDTH] IN BLOOD BY AUTOMATED COUNT: 12.6 % (ref 11.7–15.4)
FERRITIN SERPL-MCNC: 86 NG/ML (ref 15–150)
GLOBULIN SER-MCNC: 2.3 G/DL (ref 1.5–4.5)
GLUCOSE SERPL-MCNC: 81 MG/DL (ref 70–99)
HCT VFR BLD AUTO: 40.2 % (ref 34–46.6)
HGB BLD-MCNC: 13 G/DL (ref 11.1–15.9)
IMM GRANULOCYTES # BLD: 0 X10E3/UL (ref 0–0.1)
IMM GRANULOCYTES NFR BLD: 1 %
IRON SATN MFR SERPL: 25 % (ref 15–55)
IRON SERPL-MCNC: 77 UG/DL (ref 27–139)
LYMPHOCYTES # BLD AUTO: 1.7 X10E3/UL (ref 0.7–3.1)
LYMPHOCYTES NFR BLD AUTO: 26 %
MCH RBC QN AUTO: 30.6 PG (ref 26.6–33)
MCHC RBC AUTO-ENTMCNC: 32.3 G/DL (ref 31.5–35.7)
MCV RBC AUTO: 95 FL (ref 79–97)
MONOCYTES # BLD AUTO: 0.5 X10E3/UL (ref 0.1–0.9)
MONOCYTES NFR BLD AUTO: 8 %
NEUTROPHILS # BLD AUTO: 4.1 X10E3/UL (ref 1.4–7)
NEUTROPHILS NFR BLD AUTO: 63 %
PLATELET # BLD AUTO: 203 X10E3/UL (ref 150–450)
POTASSIUM SERPL-SCNC: 5.2 MMOL/L (ref 3.5–5.2)
PROT SERPL-MCNC: 7.1 G/DL (ref 6–8.5)
RBC # BLD AUTO: 4.25 X10E6/UL (ref 3.77–5.28)
SODIUM SERPL-SCNC: 144 MMOL/L (ref 134–144)
T4 FREE SERPL-MCNC: 1.28 NG/DL (ref 0.82–1.77)
TIBC SERPL-MCNC: 308 UG/DL (ref 250–450)
TSH SERPL DL<=0.005 MIU/L-ACNC: 3.72 UIU/ML (ref 0.45–4.5)
UIBC SERPL-MCNC: 231 UG/DL (ref 118–369)
VIT B12 SERPL-MCNC: 468 PG/ML (ref 232–1245)
WBC # BLD AUTO: 6.4 X10E3/UL (ref 3.4–10.8)

## 2023-08-10 ENCOUNTER — TELEPHONE (OUTPATIENT)
Dept: OBGYN CLINIC | Facility: CLINIC | Age: 63
End: 2023-08-10

## 2023-08-10 ENCOUNTER — TELEPHONE (OUTPATIENT)
Dept: OBGYN CLINIC | Facility: MEDICAL CENTER | Age: 63
End: 2023-08-10

## 2023-08-10 NOTE — TELEPHONE ENCOUNTER
Pt called. Wants to discuss her biopsy results from 06/29. Also her PCP wants to provide a new medication for her but advice to speak with OBGYN provider because it can affect her hormones levels. Stated that will like to speak only with Yulissa Quinn. Please review when you have a chance, ty!

## 2023-08-10 NOTE — TELEPHONE ENCOUNTER
Left message for patient advising Dr. Rufina Leroy is out of the office until Tuesday, August 15th. Advised patient I would send a message over to her so she can review the test results and answer any questions she may have. Advised patient to contact office if she had any additional questions or concerns we would be able to help her with.

## 2023-08-10 NOTE — TELEPHONE ENCOUNTER
Dr. Elizabeth Nails out of office until 08/15. Contacted patient and left message - will send message to Dr. Elizabeth Nails to review when she returns.

## 2023-08-15 ENCOUNTER — TELEPHONE (OUTPATIENT)
Dept: DERMATOLOGY | Facility: CLINIC | Age: 63
End: 2023-08-15

## 2023-08-15 ENCOUNTER — TELEPHONE (OUTPATIENT)
Dept: OBGYN CLINIC | Facility: MEDICAL CENTER | Age: 63
End: 2023-08-15

## 2023-08-15 NOTE — TELEPHONE ENCOUNTER
Patient called again and states her gynecologist wants to hold off on her using the topical finasteride until she does a repeat biopsy ( January). Patient will call back in January once she receives the results.

## 2023-08-15 NOTE — TELEPHONE ENCOUNTER
Patient's call was returned. Patient had questions regarding her breast biopsy. Patient developed a 2-1/2 inch hematoma after her biopsy. There was recommendation for a repeat ultrasound and mammogram in 3 months. When she had her breast biopsy, she was notified that they wanted a repeat mammogram in 6 months. Was questioning the amount of mammograms due to discomfort with the procedure. Patient counseled that she could do the ultrasound in 3 months to follow-up on the hematoma and get the mammogram in 6 months as per the original recommendations. Patient also was offered finasteride for her alopecia areata and wanted to make sure that there was no issues in light of the breast biopsies. Patient counseled that if she were very concerned she may wait for the 6-month breast imaging prior to starting the medication. All questions answered.

## 2023-08-15 NOTE — TELEPHONE ENCOUNTER
Patient had left voicemail asking for us to return her call to get a message to Dr. Martin Jones. I did attempt to contact the patient back but had to leave a voicemail.

## 2023-08-28 DIAGNOSIS — G47.00 INSOMNIA, UNSPECIFIED TYPE: ICD-10-CM

## 2023-08-28 DIAGNOSIS — G50.0 TRIGEMINAL NEURALGIA: ICD-10-CM

## 2023-08-28 DIAGNOSIS — Z78.0 MENOPAUSE: ICD-10-CM

## 2023-08-28 DIAGNOSIS — E06.3 HASHIMOTO'S DISEASE: ICD-10-CM

## 2023-08-28 DIAGNOSIS — F32.0 MAJOR DEPRESSIVE DISORDER, SINGLE EPISODE, MILD (HCC): ICD-10-CM

## 2023-08-28 RX ORDER — AMITRIPTYLINE HYDROCHLORIDE 10 MG/1
20 TABLET, FILM COATED ORAL
Qty: 180 TABLET | Refills: 1 | Status: SHIPPED | OUTPATIENT
Start: 2023-08-28

## 2023-08-28 RX ORDER — PREGABALIN 50 MG/1
50 CAPSULE ORAL 2 TIMES DAILY
Qty: 180 CAPSULE | Refills: 1 | Status: SHIPPED | OUTPATIENT
Start: 2023-08-28

## 2023-08-28 RX ORDER — ESTROGEN,CON/M-PROGEST ACET 0.3-1.5MG
1 TABLET ORAL DAILY
Qty: 84 TABLET | Refills: 1 | Status: SHIPPED | OUTPATIENT
Start: 2023-08-28

## 2023-08-28 RX ORDER — LEVOTHYROXINE SODIUM 0.05 MG/1
50 TABLET ORAL DAILY
Qty: 90 TABLET | Refills: 1 | Status: SHIPPED | OUTPATIENT
Start: 2023-08-28

## 2023-08-28 RX ORDER — ZOLPIDEM TARTRATE 10 MG/1
10 TABLET ORAL
Qty: 90 TABLET | Refills: 0 | Status: SHIPPED | OUTPATIENT
Start: 2023-08-28

## 2023-09-20 DIAGNOSIS — L65.9 HAIR LOSS: ICD-10-CM

## 2023-09-20 RX ORDER — MINOXIDIL 2.5 MG/1
1.25 TABLET ORAL DAILY
Qty: 45 TABLET | Refills: 0 | Status: SHIPPED | OUTPATIENT
Start: 2023-09-20 | End: 2023-12-19

## 2023-09-20 NOTE — TELEPHONE ENCOUNTER
Reason for call:   [x] Refill   [] Prior Auth  [] Other:     Office:   [] PCP/Provider -   [x] Speciality/Provider -  Zakiya,Nad    Medication: Minoxidil    Dose/Frequency: 2.5mg    Quantity: #45    Pharmacy: [facility]

## 2023-10-17 ENCOUNTER — TELEPHONE (OUTPATIENT)
Dept: FAMILY MEDICINE CLINIC | Facility: CLINIC | Age: 63
End: 2023-10-17

## 2023-11-16 ENCOUNTER — HOSPITAL ENCOUNTER (OUTPATIENT)
Dept: ULTRASOUND IMAGING | Facility: CLINIC | Age: 63
Discharge: HOME | End: 2023-11-16
Payer: COMMERCIAL

## 2023-11-16 DIAGNOSIS — N63.0 LUMP OR MASS IN BREAST: ICD-10-CM

## 2023-11-16 PROCEDURE — 76642 ULTRASOUND BREAST LIMITED: CPT

## 2023-11-27 DIAGNOSIS — G47.00 INSOMNIA, UNSPECIFIED TYPE: ICD-10-CM

## 2023-11-27 RX ORDER — ZOLPIDEM TARTRATE 10 MG/1
10 TABLET ORAL
Qty: 90 TABLET | Refills: 0 | Status: SHIPPED | OUTPATIENT
Start: 2023-11-27

## 2023-12-18 ENCOUNTER — TELEPHONE (OUTPATIENT)
Dept: OBGYN CLINIC | Facility: MEDICAL CENTER | Age: 63
End: 2023-12-18

## 2023-12-18 DIAGNOSIS — L65.9 HAIR LOSS: ICD-10-CM

## 2023-12-18 DIAGNOSIS — Z86.19 HISTORY OF SHINGLES: ICD-10-CM

## 2023-12-18 RX ORDER — MINOXIDIL 2.5 MG/1
1.25 TABLET ORAL DAILY
Qty: 90 TABLET | Refills: 1 | Status: SHIPPED | OUTPATIENT
Start: 2023-12-18 | End: 2024-12-12

## 2023-12-18 RX ORDER — VALACYCLOVIR HYDROCHLORIDE 500 MG/1
500 TABLET, FILM COATED ORAL DAILY
Qty: 90 TABLET | Refills: 1 | Status: SHIPPED | OUTPATIENT
Start: 2023-12-18 | End: 2024-06-15

## 2023-12-18 NOTE — TELEPHONE ENCOUNTER
Reason for call:   [x] Refill   [] Prior Auth  [] Other:     Office:   [] PCP/Provider -   [x] Specialty/Provider - Derm    Minoxidil  2.5 mg    Valtrex  500 mg    PATIENT IS ASKING FOR 90 DAYS FOR MEDICATION REFILLS    Pharmacy: [facility]  [place], [place]      Does the patient have enough for 3 days?   [x] Yes   [] No - Send as HP to POD

## 2023-12-18 NOTE — TELEPHONE ENCOUNTER
Patient called asking for a prescription sent in of diflucan. Patient has not been seen for this problem in the office before. Patient did not want to come in the office for a visit. Then told me to ask the provider  and if that is the case she will go somewhere else and hung up. I talked with the provider regarding this request. Provider said she can be seen in the office or use monistat over the counter for 7 days.     Called patient back and lvm with recommendations and to call the office back if she has any questions.

## 2024-01-03 ENCOUNTER — APPOINTMENT (EMERGENCY)
Dept: RADIOLOGY | Facility: HOSPITAL | Age: 64
End: 2024-01-03
Payer: COMMERCIAL

## 2024-01-03 ENCOUNTER — APPOINTMENT (EMERGENCY)
Dept: CT IMAGING | Facility: HOSPITAL | Age: 64
End: 2024-01-03
Payer: COMMERCIAL

## 2024-01-03 ENCOUNTER — HOSPITAL ENCOUNTER (EMERGENCY)
Facility: HOSPITAL | Age: 64
Discharge: HOME | End: 2024-01-03
Attending: EMERGENCY MEDICINE
Payer: COMMERCIAL

## 2024-01-03 VITALS
HEART RATE: 102 BPM | RESPIRATION RATE: 18 BRPM | HEIGHT: 67 IN | WEIGHT: 130 LBS | TEMPERATURE: 99.2 F | BODY MASS INDEX: 20.4 KG/M2 | SYSTOLIC BLOOD PRESSURE: 164 MMHG | OXYGEN SATURATION: 98 % | DIASTOLIC BLOOD PRESSURE: 86 MMHG

## 2024-01-03 DIAGNOSIS — K86.2 PANCREATIC CYST: ICD-10-CM

## 2024-01-03 DIAGNOSIS — R91.1 LUNG NODULE: ICD-10-CM

## 2024-01-03 DIAGNOSIS — R07.81 RIB PAIN ON RIGHT SIDE: Primary | ICD-10-CM

## 2024-01-03 DIAGNOSIS — Q24.8 PERICARDIAL CYST: ICD-10-CM

## 2024-01-03 DIAGNOSIS — N20.0 KIDNEY STONE: ICD-10-CM

## 2024-01-03 LAB
ALBUMIN SERPL BCP-MCNC: 4.4 G/DL (ref 3.5–5)
ALP SERPL-CCNC: 53 U/L (ref 34–104)
ALT SERPL W P-5'-P-CCNC: 13 U/L (ref 7–52)
ANION GAP SERPL CALCULATED.3IONS-SCNC: 8 MMOL/L
AST SERPL W P-5'-P-CCNC: 19 U/L (ref 13–39)
BASOPHILS # BLD AUTO: 0.03 THOUSANDS/ÂΜL (ref 0–0.1)
BASOPHILS NFR BLD AUTO: 1 % (ref 0–1)
BILIRUB DIRECT SERPL-MCNC: 0.07 MG/DL (ref 0–0.2)
BILIRUB SERPL-MCNC: 0.4 MG/DL (ref 0.2–1)
BILIRUB UR QL STRIP: NEGATIVE
BUN SERPL-MCNC: 18 MG/DL (ref 5–25)
CALCIUM SERPL-MCNC: 9.5 MG/DL (ref 8.4–10.2)
CARDIAC TROPONIN I PNL SERPL HS: <2 NG/L
CHLORIDE SERPL-SCNC: 104 MMOL/L (ref 96–108)
CLARITY UR: CLEAR
CO2 SERPL-SCNC: 26 MMOL/L (ref 21–32)
COLOR UR: YELLOW
CREAT SERPL-MCNC: 1 MG/DL (ref 0.6–1.3)
EOSINOPHIL # BLD AUTO: 0.06 THOUSAND/ÂΜL (ref 0–0.61)
EOSINOPHIL NFR BLD AUTO: 1 % (ref 0–6)
ERYTHROCYTE [DISTWIDTH] IN BLOOD BY AUTOMATED COUNT: 12.9 % (ref 11.6–15.1)
GFR SERPL CREATININE-BSD FRML MDRD: 59 ML/MIN/1.73SQ M
GLUCOSE SERPL-MCNC: 83 MG/DL (ref 65–140)
GLUCOSE UR STRIP-MCNC: NEGATIVE MG/DL
HCT VFR BLD AUTO: 41.1 % (ref 34.8–46.1)
HGB BLD-MCNC: 13.1 G/DL (ref 11.5–15.4)
HGB UR QL STRIP.AUTO: NEGATIVE
IMM GRANULOCYTES # BLD AUTO: 0.01 THOUSAND/UL (ref 0–0.2)
IMM GRANULOCYTES NFR BLD AUTO: 0 % (ref 0–2)
KETONES UR STRIP-MCNC: NEGATIVE MG/DL
LEUKOCYTE ESTERASE UR QL STRIP: NEGATIVE
LIPASE SERPL-CCNC: 63 U/L (ref 11–82)
LYMPHOCYTES # BLD AUTO: 1.22 THOUSANDS/ÂΜL (ref 0.6–4.47)
LYMPHOCYTES NFR BLD AUTO: 28 % (ref 14–44)
MAGNESIUM SERPL-MCNC: 2.1 MG/DL (ref 1.9–2.7)
MCH RBC QN AUTO: 29.7 PG (ref 26.8–34.3)
MCHC RBC AUTO-ENTMCNC: 31.9 G/DL (ref 31.4–37.4)
MCV RBC AUTO: 93 FL (ref 82–98)
MONOCYTES # BLD AUTO: 0.39 THOUSAND/ÂΜL (ref 0.17–1.22)
MONOCYTES NFR BLD AUTO: 9 % (ref 4–12)
NEUTROPHILS # BLD AUTO: 2.64 THOUSANDS/ÂΜL (ref 1.85–7.62)
NEUTS SEG NFR BLD AUTO: 61 % (ref 43–75)
NITRITE UR QL STRIP: NEGATIVE
NRBC BLD AUTO-RTO: 0 /100 WBCS
PH UR STRIP.AUTO: 5.5 [PH]
PLATELET # BLD AUTO: 180 THOUSANDS/UL (ref 149–390)
PMV BLD AUTO: 10.8 FL (ref 8.9–12.7)
POTASSIUM SERPL-SCNC: 4.1 MMOL/L (ref 3.5–5.3)
PROT SERPL-MCNC: 6.8 G/DL (ref 6.4–8.4)
PROT UR STRIP-MCNC: NEGATIVE MG/DL
RBC # BLD AUTO: 4.41 MILLION/UL (ref 3.81–5.12)
SODIUM SERPL-SCNC: 138 MMOL/L (ref 135–147)
SP GR UR STRIP.AUTO: <=1.005
UROBILINOGEN UR QL STRIP.AUTO: 0.2 E.U./DL
WBC # BLD AUTO: 4.35 THOUSAND/UL (ref 4.31–10.16)

## 2024-01-03 PROCEDURE — 83735 ASSAY OF MAGNESIUM: CPT

## 2024-01-03 PROCEDURE — 80048 BASIC METABOLIC PNL TOTAL CA: CPT

## 2024-01-03 PROCEDURE — 71275 CT ANGIOGRAPHY CHEST: CPT

## 2024-01-03 PROCEDURE — 81003 URINALYSIS AUTO W/O SCOPE: CPT

## 2024-01-03 PROCEDURE — 99285 EMERGENCY DEPT VISIT HI MDM: CPT

## 2024-01-03 PROCEDURE — 36415 COLL VENOUS BLD VENIPUNCTURE: CPT

## 2024-01-03 PROCEDURE — 83690 ASSAY OF LIPASE: CPT

## 2024-01-03 PROCEDURE — 74177 CT ABD & PELVIS W/CONTRAST: CPT

## 2024-01-03 PROCEDURE — 85025 COMPLETE CBC W/AUTO DIFF WBC: CPT

## 2024-01-03 PROCEDURE — 93005 ELECTROCARDIOGRAM TRACING: CPT

## 2024-01-03 PROCEDURE — 80076 HEPATIC FUNCTION PANEL: CPT

## 2024-01-03 PROCEDURE — 71045 X-RAY EXAM CHEST 1 VIEW: CPT

## 2024-01-03 PROCEDURE — 84484 ASSAY OF TROPONIN QUANT: CPT

## 2024-01-03 RX ORDER — SODIUM CHLORIDE 9 MG/ML
3 INJECTION INTRAVENOUS
Status: DISCONTINUED | OUTPATIENT
Start: 2024-01-03 | End: 2024-01-03 | Stop reason: HOSPADM

## 2024-01-03 RX ADMIN — IOHEXOL 100 ML: 350 INJECTION, SOLUTION INTRAVENOUS at 10:50

## 2024-01-03 NOTE — DISCHARGE INSTRUCTIONS
- New 2 mm right upper lobe lung nodule. Based on current Fleischner Society 2017 Guidelines on incidental pulmonary nodule, no routine follow-up is needed if the patient is low risk.  If the patient is high risk, optional follow-up chest CT at 12 , months can be considered., Please follow up with your PCP for this. Without appropriate follow up, diagnosis such as but not limited to cancer can be missed.   - Pericardial cyst, slightly increased from 2015 though consistent with benign growth., Please follow up with your PCP for this. Without appropriate follow up, diagnosis such as but not limited to cancer can be missed.   - Tiny pancreatic cyst has decreased from 2015 and is compatible with a benign cyst.,Please follow up with your PCP for this. Without appropriate follow up, diagnosis such as but not limited to cancer can be missed.   - 3 mm nonobstructing right renal calculus., Follow up with urology for this. Their number is listed below. Return for urinary symptoms including blood in urine, difficulty burning, flank pain.   Take tylenol as needed for pain.

## 2024-01-03 NOTE — ED PROVIDER NOTES
History  Chief Complaint   Patient presents with   • Rib Pain     Right rib/breast pain history of biopsies and hematomas last . Not sure if related.     Patient is a 63-year-old female with a past medical history of thyroid disorder, breast biopsy, pulmonary embolism arriving for evaluation of right rib pain.  Patient states that this started 4 to 5 days ago.  Patient states that she will intermittently have right breast pain after having an ultrasound which was in November.  Patient states that this pain is different.  Patient reports that she has a history of PE that was attributed to a lower leg fracture and this pain is different than that.  Patient is currently taking an estrogen replacement.  Patient denies pain with breathing, denies chest pain.  Patient reports that she has a gas sensation that is then relieved.  Patient reports that the pain is constant and wraps around from front to back of her right lower ribs.  Patient denies any blood in urine or stool.  Patient denies lower abdominal pain.  Patient denies food attributing to pain.  Patient states that she has had erosive gastritis in the past.      Prior to Admission Medications   Prescriptions Last Dose Informant Patient Reported? Taking?   Cimetidine (TAGAMET PO)   Yes No   Sig: Take 1 tablet by mouth as needed (gerd)   Pren-Fe-Meth-FA-Omeg w/o A (PrimaCare) 30-1-470 MG CAPS   Yes No   Sig: Take by mouth   Patient not taking: Reported on 2023   Zinc 50 MG TABS  Self Yes No   Sig: Take 1 tablet by mouth daily   Patient not taking: Reported on 2023   acetaminophen (TYLENOL) 325 mg tablet  Self Yes No   Sig: Take 650 mg by mouth daily at bedtime    amitriptyline (ELAVIL) 10 mg tablet   No No   Sig: Take 2 tablets (20 mg total) by mouth daily at bedtime   azelastine (ASTELIN) 0.1 % nasal spray   Yes No   Si spray into each nostril 2 (two) times a day Use in each nostril as directed   cholecalciferol (VITAMIN D3) 1,000 units tablet   Self Yes No   Sig: Take 2,000 Units by mouth daily    clobetasol (OLUX) 0.05 % topical foam   No No   Sig: Apply on scalp for itchy rash   cyanocobalamin (VITAMIN B-12) 100 mcg tablet  Self Yes No   Sig: Take 100 mcg by mouth 2 (two) times a week    Patient not taking: Reported on 6/12/2023   estrogen, conjugated,-medroxyprogesterone (Prempro) 0.3-1.5 MG per tablet   No No   Sig: Take 1 tablet by mouth daily   ketoconazole (NIZORAL) 2 % shampoo   No No   Sig: Apply and wash twice weekly on scalp   levothyroxine (Synthroid) 50 mcg tablet   No No   Sig: Take 1 tablet (50 mcg total) by mouth daily   loratadine (CLARITIN) 10 mg tablet   Yes No   Sig: Take 10 mg by mouth daily   minoxidil (LONITEN) 2.5 mg tablet   No No   Sig: Take 0.5 tablets (1.25 mg total) by mouth daily   minoxidil (ROGAINE) 2 % external solution   No No   Sig: Apply topically 2 (two) times a day   prednisoLONE acetate (PRED FORTE) 1 % ophthalmic suspension   No No   Sig: Administer 1 drop to both eyes 3 (three) times a day   pregabalin (LYRICA) 50 mg capsule   No No   Sig: Take 1 capsule (50 mg total) by mouth 2 (two) times a day   rizatriptan (MAXALT) 5 MG tablet  Self Yes No   Sig: Take 5 mg by mouth as needed    sertraline (ZOLOFT) 50 mg tablet   No No   Sig: Take 2 tablets (100 mg total) by mouth daily at bedtime   valACYclovir (VALTREX) 500 mg tablet   No No   Sig: Take 1 tablet (500 mg total) by mouth daily   zolpidem (AMBIEN) 10 mg tablet   No No   Sig: Take 1 tablet (10 mg total) by mouth daily at bedtime as needed for sleep      Facility-Administered Medications: None       Past Medical History:   Diagnosis Date   • ADHD     last assessed: 1/13/2016   • Allergic    • Anxiety    • Depression    • Disease of thyroid gland    • GERD with esophagitis     last assessed: 5/10/2018   • Hashimoto's thyroiditis     last assessed: 1/13/2016   • Herpesviral vesicular dermatitis     last assessed: 7/26/2018   • Hyperlipidemia    • IBS (irritable bowel  syndrome)     last assessed: 2016   • Memory loss    • Migraines     last assessed: 2016   • 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)     last assessed: 5/10/2018   • Traumatic brain injury (HCC)     last assessed: 2016   • Trigeminal neuralgia     last assessed: 2016       Past Surgical History:   Procedure Laterality Date   • BREAST BIOPSY Right    • LYMPH NODE BIOPSY     • MAMMO STEREOTACTIC BREAST BIOPSY RIGHT (ALL INC) Right 2023   • MAMMO STEREOTACTIC BREAST BIOPSY RIGHT (ALL INC) EACH ADD Right 2023   • ORBITAL RIM RECONSTRUCTION Right    • TONSILLECTOMY Bilateral    • WISDOM TOOTH EXTRACTION         Family History   Problem Relation Age of Onset   • Heart disease Mother    • Heart disease Father    • No Known Problems Sister    • No Known Problems Sister    • No Known Problems Maternal Grandmother    • No Known Problems Maternal Grandfather    • No Known Problems Paternal Grandmother    • No Known Problems Paternal Grandfather    • No Known Problems Maternal Aunt    • No Known Problems Maternal Aunt    • No Known Problems Paternal Aunt      I have reviewed and agree with the history as documented.    E-Cigarette/Vaping   • E-Cigarette Use Never User      E-Cigarette/Vaping Substances   • Nicotine No    • THC No    • CBD No    • Flavoring No    • Other No    • Unknown No      Social History     Tobacco Use   • Smoking status: Former     Current packs/day: 0.00     Average packs/day: 0.5 packs/day for 23.0 years (11.5 ttl pk-yrs)     Types: Cigarettes     Start date:      Quit date:      Years since quittin.0   • Smokeless tobacco: Never   Vaping Use   • Vaping status: Never Used   Substance Use Topics   • Alcohol use: Never     Comment: Denies use   • Drug use: Never     Comment: Denies use       Review of Systems   Constitutional: Negative.    HENT: Negative.     Eyes: Negative.    Respiratory: Negative.  Negative for shortness of breath.     Cardiovascular: Negative.    Gastrointestinal: Negative.    Endocrine: Negative.    Genitourinary: Negative.    Musculoskeletal: Negative.    Allergic/Immunologic: Negative.    Neurological: Negative.    Hematological: Negative.    Psychiatric/Behavioral: Negative.     All other systems reviewed and are negative.      Physical Exam  Physical Exam  Vitals and nursing note reviewed.   Constitutional:       Appearance: Normal appearance. She is normal weight.   HENT:      Head: Normocephalic.      Right Ear: External ear normal.      Left Ear: External ear normal.      Nose: Nose normal.      Mouth/Throat:      Mouth: Mucous membranes are moist.   Eyes:      Extraocular Movements: Extraocular movements intact.      Pupils: Pupils are equal, round, and reactive to light.   Cardiovascular:      Rate and Rhythm: Regular rhythm. Tachycardia present.      Pulses: Normal pulses.      Heart sounds: Normal heart sounds.   Pulmonary:      Effort: Pulmonary effort is normal.      Breath sounds: Normal breath sounds.   Abdominal:      General: Bowel sounds are normal.      Tenderness: There is abdominal tenderness.   Musculoskeletal:         General: Normal range of motion.      Cervical back: Normal range of motion and neck supple.   Skin:     General: Skin is warm.      Capillary Refill: Capillary refill takes less than 2 seconds.   Neurological:      General: No focal deficit present.      Mental Status: She is alert and oriented to person, place, and time. Mental status is at baseline.   Psychiatric:         Mood and Affect: Mood normal.         Thought Content: Thought content normal.         Judgment: Judgment normal.       Vital Signs  ED Triage Vitals [01/03/24 0919]   Temperature Pulse Respirations Blood Pressure SpO2   99.2 °F (37.3 °C) 102 18 164/86 98 %      Temp Source Heart Rate Source Patient Position - Orthostatic VS BP Location FiO2 (%)   Temporal Monitor Sitting Left arm --      Pain Score       5      "      Vitals:    01/03/24 0919   BP: 164/86   Pulse: 102   Patient Position - Orthostatic VS: Sitting         Visual Acuity      ED Medications  Medications - No data to display    Diagnostic Studies  Results Reviewed       None                   No orders to display              Procedures  Procedures         ED Course  ED Course as of 01/04/24 1951 Wed Jan 03, 2024   1003 CBC and differential  No leukocytosis, no anemia.     1005 Patient refusing covid/flu/rsv test stating she is under no circumstances suppose to remove her mask.    1030 hs TnI 0hr: <2   1030 Lipase: 63   1118 Continued to offer pain medication and refuses. Patient states, \"Can only take tylenol and won't take my mask off.\"                                SBIRT 22yo+      Flowsheet Row Most Recent Value   Initial Alcohol Screen: US AUDIT-C     1. How often do you have a drink containing alcohol? 0 Filed at: 01/03/2024 0921   2. How many drinks containing alcohol do you have on a typical day you are drinking?  0 Filed at: 01/03/2024 0921   3b. FEMALE Any Age, or MALE 65+: How often do you have 4 or more drinks on one occassion? 0 Filed at: 01/03/2024 0921   Audit-C Score 0 Filed at: 01/03/2024 0921   SUSAN: How many times in the past year have you...    Used an illegal drug or used a prescription medication for non-medical reasons? Never Filed at: 01/03/2024 0921                      Medical Decision Making  DDx: Pulmonary embolism, acute cholecystitis, choledocholithiasis, pancreatitis   Patient has a history of PE, no anticoagulated and on an estrogen replacement tablet.     Amount and/or Complexity of Data Reviewed  Labs: ordered. Decision-making details documented in ED Course.  Radiology: ordered and independent interpretation performed.    Risk  Prescription drug management.           Disposition  Final diagnoses:   None     ED Disposition       None          Follow-up Information    None         Patient's Medications   Discharge " Prescriptions    No medications on file       No discharge procedures on file.    PDMP Review         Value Time User    PDMP Reviewed  Yes 11/27/2023 11:42 AM Holly Cardenas DO            ED Provider  Electronically Signed by           "can be considered.      4.  Pericardial cyst, slightly increased from 2015 though consistent with benign growth.      5.  Tiny pancreatic cyst has decreased from 2015 and is compatible with a benign cyst.      6.  3 mm nonobstructing right renal calculus.      The study was marked in EPIC for immediate notification.               Workstation performed: [id number]         X-ray chest 1 view portable   ED Interpretation by LEXI Coe (01/03 1049)   No acute cardiopulmonary disease.       Final Result by Vahid Becerra MD (01/03 2024)      No acute cardiopulmonary disease.                  Workstation performed: [id number]                    Procedures  ECG 12 Lead Documentation Only    Date/Time: 1/3/2024 9:58 AM    Performed by: LEXI Coe  Authorized by: LEXI Coe    Indications / Diagnosis:  Lower rib pain  ECG reviewed by me, the ED Provider: yes    Patient location:  ED  Interpretation:     Interpretation: normal    Rate:     ECG rate:  79    ECG rate assessment: normal    Rhythm:     Rhythm: sinus rhythm    Ectopy:     Ectopy: none    QRS:     QRS axis:  Normal  Conduction:     Conduction: normal    ST segments:     ST segments:  Normal  T waves:     T waves: normal             ED Course  ED Course as of 01/08/24 0958   Wed Jan 03, 2024   1003 CBC and differential  No leukocytosis, no anemia.     1005 Patient refusing covid/flu/rsv test stating she is under no circumstances suppose to remove her mask.    1030 hs TnI 0hr: <2   1030 Lipase: 63   1118 Continued to offer pain medication and refuses. Patient states, \"Can only take tylenol and won't take my mask off.\"              HEART Risk Score      Flowsheet Row Most Recent Value   Heart Score Risk Calculator    History 0 Filed at: 01/03/2024 1200   ECG 0 Filed at: 01/03/2024 1200   Age 1 Filed at: 01/03/2024 1200   Risk Factors 1 Filed at: 01/03/2024 1200   Troponin 0 Filed at: 01/03/2024 1200   HEART Score 2 Filed at: " 01/03/2024 1200                          SBIRT 20yo+      Flowsheet Row Most Recent Value   Initial Alcohol Screen: US AUDIT-C     1. How often do you have a drink containing alcohol? 0 Filed at: 01/03/2024 0921   2. How many drinks containing alcohol do you have on a typical day you are drinking?  0 Filed at: 01/03/2024 0921   3b. FEMALE Any Age, or MALE 65+: How often do you have 4 or more drinks on one occassion? 0 Filed at: 01/03/2024 0921   Audit-C Score 0 Filed at: 01/03/2024 0921   SUSAN: How many times in the past year have you...    Used an illegal drug or used a prescription medication for non-medical reasons? Never Filed at: 01/03/2024 0921            Wells' Criteria for PE      Flowsheet Row Most Recent Value   Wells' Criteria for PE    Clinical signs and symptoms of DVT 0 Filed at: 01/03/2024 0933   PE is primary diagnosis or equally likely 3 Filed at: 01/03/2024 0933   HR >100 1.5 Filed at: 01/03/2024 0933   Immobilization at least 3 days or Surgery in the previous 4 weeks 0 Filed at: 01/03/2024 0933   Previous, objectively diagnosed PE or DVT 1.5 Filed at: 01/03/2024 0933   Hemoptysis 0 Filed at: 01/03/2024 0933   Malignancy with treatment within 6 months or palliative 0 Filed at: 01/03/2024 0933   Wells' Criteria Total 6 Filed at: 01/03/2024 0933                  Medical Decision Making  DDx: Pulmonary embolism, acute cholecystitis, choledocholithiasis, pancreatitis   Patient has a history of PE, no anticoagulated and on an estrogen replacement tablet.  Patient's well score is high.  Patient also reports that this is lower right rib pain, radiates around to her right lower ribs posteriorly.  Patient has no pain with deep inspiration.  Reports that she has not had any fevers or chills.  Patient denies any substernal chest pain, left-sided chest pain.    Patient blood work today is reassuring as she has no leukocytosis, no anemia, no BIPIN, no Bonnie abnormality.  No troponin is less than 2, and as this  pain has been going on for over 3 hours, persimmon cc evident no further treatment required.  Patient is in normal sinus EKG without any ST changes.  Patient declines pain medication multiple times states that she cannot take Tylenol and does not wish to take her mask off.  Patient refusing to take her mask off for RSV, COVID, flu test.  Patient states that she is still wearing her mask everywhere on the recommendation of her PCP.  Discussed with patient the importance of testing for complete workup however patient declines.  Encourage patient to test while at home in the privacy of her own house.  Patient's urine does not indicate UTI.  LFTs within normal limits.  Patient heart score of 2.  Patient's pain is believed not to be related to ACS.  Patient also noting throughout the exam that when she bends to the left side it does reproduce the pain on her right side.  This lends itself to boring musculoskeletal pain.  Patient CT PE abdomen pelvis study findings appreciated, no acute pathology.  Incidental finding conversation: - New 2 mm right upper lobe lung nodule. Based on current Fleischner Society 2017 Guidelines on incidental pulmonary nodule, no routine follow-up is needed if the patient is low risk.  If the patient is high risk, optional follow-up chest CT at 12 , months can be considered., Please follow up with your PCP for this. Without appropriate follow up, diagnosis such as but not limited to cancer can be missed.   - Pericardial cyst, slightly increased from 2015 though consistent with benign growth., Please follow up with your PCP for this. Without appropriate follow up, diagnosis such as but not limited to cancer can be missed.   - Tiny pancreatic cyst has decreased from 2015 and is compatible with a benign cyst.,Please follow up with your PCP for this. Without appropriate follow up, diagnosis such as but not limited to cancer can be missed. Patient reports unaware of this.   - 3 mm nonobstructing  right renal calculus., Follow up with urology for this. Return for urinary symptoms including blood in urine, difficulty burning, flank pain. Discussed do not believe this is cause of patient's pain today as pain starts anteriorly.   Discussed strict return precautions.    Reviewed reasons to return to ed.  Patient verbalized understanding of diagnosis and agreement with discharge plan of care as well as understanding of reasons to return to ed.          Amount and/or Complexity of Data Reviewed  Labs: ordered. Decision-making details documented in ED Course.  Radiology: ordered and independent interpretation performed.    Risk  Prescription drug management.             Disposition  Final diagnoses:   Rib pain on right side   Kidney stone   Pericardial cyst   Lung nodule   Pancreatic cyst     Time reflects when diagnosis was documented in both MDM as applicable and the Disposition within this note       Time User Action Codes Description Comment    1/3/2024  1:15 PM Elva Cook [R07.81] Rib pain on right side     1/3/2024  1:15 PM Elva Cook Add [N20.0] Kidney stone     1/3/2024  1:16 PM Elva Cook Add [Q24.8] Pericardial cyst     1/3/2024  1:16 PM Elva Cook Add [R91.1] Lung nodule     1/3/2024  1:16 PM Elva Cook Add [K86.2] Pancreatic cyst           ED Disposition       ED Disposition   Discharge    Condition   Stable    Date/Time   Wed Ming 3, 2024 1313    Comment   Lynnette WYLIE Apanavage discharge to home/self care.                   Follow-up Information       Follow up With Specialties Details Why Contact Info Additional Information    [facility] Emergency Medicine Go to  If symptoms worsen [address] [facility] [address] [phone] [facility], [address], [place], [place] [address]    Holly Cardenas DO Family Medicine Schedule an appointment as soon as  possible for a visit in 2 days abnormal CT findings [address]  [address]  [place] [address]  [phone]       Oh Bryan MD Urology Schedule an appointment as soon as possible for a visit in 2 days For further evaluation of kidney stone [address] [facility] [address]  [phone]               Discharge Medication List as of 1/3/2024  1:27 PM        CONTINUE these medications which have NOT CHANGED    Details   amitriptyline (ELAVIL) 10 mg tablet Take 2 tablets (20 mg total) by mouth daily at bedtime, Starting Mon 8/28/2023, Normal      estrogen, conjugated,-medroxyprogesterone (Prempro) 0.3-1.5 MG per tablet Take 1 tablet by mouth daily, Starting Mon 8/28/2023, Normal      levothyroxine (Synthroid) 50 mcg tablet Take 1 tablet (50 mcg total) by mouth daily, Starting Mon 8/28/2023, Normal      pregabalin (LYRICA) 50 mg capsule Take 1 capsule (50 mg total) by mouth 2 (two) times a day, Starting Mon 8/28/2023, Normal      sertraline (ZOLOFT) 50 mg tablet Take 2 tablets (100 mg total) by mouth daily at bedtime, Starting Mon 8/28/2023, Normal      acetaminophen (TYLENOL) 325 mg tablet Take 650 mg by mouth daily at bedtime , Historical Med      azelastine (ASTELIN) 0.1 % nasal spray 1 spray into each nostril 2 (two) times a day Use in each nostril as directed, Historical Med      cholecalciferol (VITAMIN D3) 1,000 units tablet Take 2,000 Units by mouth daily , Historical Med      Cimetidine (TAGAMET PO) Take 1 tablet by mouth as needed (gerd), Historical Med      clobetasol (OLUX) 0.05 % topical foam Apply on scalp for itchy rash, Normal      cyanocobalamin (VITAMIN B-12) 100 mcg tablet Take 100 mcg by mouth 2 (two) times a week , Historical Med      ketoconazole (NIZORAL) 2 % shampoo Apply and wash twice weekly on scalp, Normal      loratadine (CLARITIN) 10 mg tablet Take 10 mg by mouth daily, Historical Med      minoxidil (LONITEN) 2.5 mg tablet Take 0.5 tablets (1.25 mg total) by mouth daily,  Starting Mon 12/18/2023, Until Thu 12/12/2024, Normal      minoxidil (ROGAINE) 2 % external solution Apply topically 2 (two) times a day, Starting Wed 7/5/2023, Normal      prednisoLONE acetate (PRED FORTE) 1 % ophthalmic suspension Administer 1 drop to both eyes 3 (three) times a day, Starting Thu 2/24/2022, Normal      Pren-Fe-Meth-FA-Omeg w/o A (PrimaCare) 30-1-470 MG CAPS Take by mouth, Historical Med      rizatriptan (MAXALT) 5 MG tablet Take 5 mg by mouth as needed , Historical Med      valACYclovir (VALTREX) 500 mg tablet Take 1 tablet (500 mg total) by mouth daily, Starting Mon 12/18/2023, Until Sat 6/15/2024, Normal      Zinc 50 MG TABS Take 1 tablet by mouth daily, Historical Med      zolpidem (AMBIEN) 10 mg tablet Take 1 tablet (10 mg total) by mouth daily at bedtime as needed for sleep, Starting Mon 11/27/2023, Normal             No discharge procedures on file.    PDMP Review         Value Time User    PDMP Reviewed  Yes 11/27/2023 11:42 AM Holly Cardenas DO            ED Provider  Electronically Signed by             LEXI Coe  01/08/24 7814

## 2024-01-03 NOTE — ED NOTES
Patient refusing testing via nasal swab at this time, due to compromised immune system patient will not remove her mask.        Fara Flores RN  01/03/24 1002

## 2024-01-04 LAB
ATRIAL RATE: 79 BPM
P AXIS: 79 DEGREES
PR INTERVAL: 164 MS
QRS AXIS: 78 DEGREES
QRSD INTERVAL: 72 MS
QT INTERVAL: 368 MS
QTC INTERVAL: 421 MS
T WAVE AXIS: 79 DEGREES
VENTRICULAR RATE: 79 BPM

## 2024-01-11 ENCOUNTER — TELEPHONE (OUTPATIENT)
Age: 64
End: 2024-01-11

## 2024-01-23 ENCOUNTER — OFFICE VISIT (OUTPATIENT)
Dept: FAMILY MEDICINE CLINIC | Facility: CLINIC | Age: 64
End: 2024-01-23
Payer: COMMERCIAL

## 2024-01-23 VITALS
WEIGHT: 134 LBS | HEART RATE: 82 BPM | TEMPERATURE: 97.2 F | HEIGHT: 67 IN | DIASTOLIC BLOOD PRESSURE: 78 MMHG | OXYGEN SATURATION: 99 % | SYSTOLIC BLOOD PRESSURE: 124 MMHG | BODY MASS INDEX: 21.03 KG/M2

## 2024-01-23 DIAGNOSIS — Z00.00 HEALTH CARE MAINTENANCE: Primary | ICD-10-CM

## 2024-01-23 DIAGNOSIS — Z13.220 SCREENING FOR HYPERLIPIDEMIA: ICD-10-CM

## 2024-01-23 DIAGNOSIS — R07.81 RIB PAIN ON RIGHT SIDE: ICD-10-CM

## 2024-01-23 DIAGNOSIS — E55.9 VITAMIN D DEFICIENCY: ICD-10-CM

## 2024-01-23 DIAGNOSIS — G47.00 INSOMNIA, UNSPECIFIED TYPE: ICD-10-CM

## 2024-01-23 DIAGNOSIS — G50.0 TRIGEMINAL NEURALGIA: ICD-10-CM

## 2024-01-23 DIAGNOSIS — N64.4 BREAST PAIN, RIGHT: ICD-10-CM

## 2024-01-23 DIAGNOSIS — E06.3 HASHIMOTO'S THYROIDITIS: ICD-10-CM

## 2024-01-23 DIAGNOSIS — F32.0 MAJOR DEPRESSIVE DISORDER, SINGLE EPISODE, MILD (HCC): ICD-10-CM

## 2024-01-23 PROCEDURE — 99396 PREV VISIT EST AGE 40-64: CPT | Performed by: FAMILY MEDICINE

## 2024-01-23 NOTE — PATIENT INSTRUCTIONS
Here for general PE and right rib and under right breast pain, check right rib series and also consult general Surgery for this right breast pain with radiation to right rib area lateral breast area. Patient concerned. Rec to get updated labs and will take all meds as directed for depression and also thyroid and meds for insomnia and trigeminal neuralgia.

## 2024-02-08 NOTE — PROGRESS NOTES
Assessment/Plan:   Lynnette Hewitt is a 63 y.o.female who is here for a breast concern.   Chief Complaint   Patient presents with   • New Patient Visit     Patient is here today for a new patient appointment.   She has right sided breast pain that started December 2024.         Breast follow-up.  A heterogeneous mass is noted in the right breast at the 10 o'clock position measuring approximately 1.6 cm most recently on a November 2023 ultrasound.  Findings actually decreased in size and this is status post biopsy for benign lesion.    Biopsy on June 2023 shows fibrocystic changes likely benign process but close follow-up.  This is of the right breast lesion at 10:00.    Bilateral mammogram was recommended with a right breast ultrasound in April 2024.  This was all arranged through outside office this is the first visit to this office by this patient.    Currently:     April 23 mammogram--developed right shoulder pain.  June '23 stereo biopsy bilateral neg pathology, subsequent hematoma    August 2023 saw LIZANDRO and had a workup for her shoulder which was local disease and bursitis soundly  November 23 follow-up films showed resolving hematoma  December 23 after [place] developed concomitant severe chest wall pain and breast pain without current etiology.  Not related to food, eating or anything other than increased activity  January 24 PE chest CT negative for any pathology    Still with severe breast pain and chest wall pain, worse with activity, not related to GI or food.  Shoulder better but chest wall pain is debilitating.        On exam and upon review of recent breast imaging:  Birads: 3 this was just an ultrasound in November 2023.  Bilateral films were April 2023 which then led to the stereotactic biopsy.    Plan: 1.  proceed with follow-up breast imaging in April 2024      2.  Order MRI of the breast for exquisite unremitting right breast and chest wall pain, if we can get this covered by insurance since  her breast cancer risk is not actively high but she does have exquisite breast pain without CAT scan or mammogram findings.    3.  GYN consult to discuss her 17 or 18-year history of Prempro.  Studies have shown that hormonal manipulation after 3 years increases the risk of breast cancer dramatically.  This has been discussed with her and I strongly advised that she reconsider the type of hormone therapy that she is on.      In preparation for this visit all relevant and known prior office notes, prior consultations, emergency room visits, blood work results, and imaging studies were personally reviewed.  A total of  45 -50 minutes was spent reviewing all of this information, caring for this patient, providing differential diagnosis, instructions for management, counseling and coordination of care.  This also includes planning surgical intervention where indicated.  A great deal of time was spent flushing out this history, reviewing the films with her in the office which we did personally, and decided on a action plan.  This was counseling as well as physical examination.    I personally reviewed the films both her mammogram and her CAT scan with the patient in the room.  _______________________________________________________  HPI:  Lynnette Hewitt is a 63 y.o.female who was referred for evaluation of New Patient Visit (Patient is here today for a new patient appointment.   She has right sided breast pain that started December 2024.  )  .    Currently: Duration symptoms:     Symptoms:   negative for breast lumps    Most recent mammogram:     Number of children: G 0    Age at first child or pregnancy >3 months: 0    Menopause: 44    Hormone Replacement Therapy:   PrimPro for 17-18 years     Previous breast biopsy: None reported    Family history: None    Previous Genetic Screening: no    ROS:  General ROS: negative  negative for - chills, fatigue, fever or night sweats, weight loss  Respiratory ROS: no cough,  shortness of breath, or wheezing  Cardiovascular ROS: no chest pain or dyspnea on exertion  Genito-Urinary ROS: no dysuria, trouble voiding, or hematuria  Musculoskeletal ROS: negative for - gait disturbance, joint pain or muscle pain  Neurological ROS: no TIA or stroke symptoms  Breast ROS: see HPI  GI ROS: see HPI  Skin ROS: no new rashes or lesions   Lymphatic ROS: no new adenopathy noted by pt.   GYN ROS: see HPI, no new GYN history or bleeding noted  Psy ROS: no new mental or behavioral disturbances               Patient Active Problem List   Diagnosis   • Traumatic brain injury (HCC)   • Common variable immunodeficiency (HCC)   • Hashimoto's thyroiditis   • Migraine without aura or status migrainosus   • Trigeminal neuralgia   • Gastroesophageal reflux disease without esophagitis   • Vitamin D deficiency   • ADHD   • Acute bronchitis due to other specified organisms   • Insomnia   • Moderate episode of recurrent major depressive disorder (HCC)   • Cellulitis of lower extremity   • Eyelid abnormality   • Mild intermittent asthma without complication   • Major depressive disorder, single episode, mild ([facility])   • Vaginal yeast infection   • Itchy eyes   • Encounter for screening mammogram for malignant neoplasm of breast   • Urinary incontinence         Allergies: Cleocin  [clindamycin hcl] and Clindamycin    Meds:   Current Outpatient Medications:   •  acetaminophen (TYLENOL) 325 mg tablet, Take 650 mg by mouth daily at bedtime , Disp: , Rfl:   •  amitriptyline (ELAVIL) 10 mg tablet, Take 2 tablets (20 mg total) by mouth daily at bedtime, Disp: 180 tablet, Rfl: 1  •  azelastine (ASTELIN) 0.1 % nasal spray, 1 spray into each nostril 2 (two) times a day Use in each nostril as directed, Disp: , Rfl:   •  cholecalciferol (VITAMIN D3) 1,000 units tablet, Take 2,000 Units by mouth daily , Disp: , Rfl:   •  Cimetidine (TAGAMET PO), Take 1 tablet by mouth as needed (gerd), Disp: , Rfl:   •  clobetasol (OLUX) 0.05 %  topical foam, Apply on scalp for itchy rash, Disp: 50 g, Rfl: 4  •  estrogen, conjugated,-medroxyprogesterone (Prempro) 0.3-1.5 MG per tablet, Take 1 tablet by mouth daily, Disp: 84 tablet, Rfl: 1  •  ketoconazole (NIZORAL) 2 % shampoo, Apply and wash twice weekly on scalp, Disp: 100 mL, Rfl: 11  •  levothyroxine (Synthroid) 50 mcg tablet, Take 1 tablet (50 mcg total) by mouth daily, Disp: 90 tablet, Rfl: 1  •  loratadine (CLARITIN) 10 mg tablet, Take 10 mg by mouth daily, Disp: , Rfl:   •  minoxidil (LONITEN) 2.5 mg tablet, Take 0.5 tablets (1.25 mg total) by mouth daily, Disp: 90 tablet, Rfl: 1  •  minoxidil (ROGAINE) 2 % external solution, Apply topically 2 (two) times a day, Disp: 30 mL, Rfl: 6  •  pregabalin (LYRICA) 50 mg capsule, Take 1 capsule (50 mg total) by mouth 2 (two) times a day, Disp: 180 capsule, Rfl: 1  •  rizatriptan (MAXALT) 5 MG tablet, Take 5 mg by mouth as needed , Disp: , Rfl:   •  sertraline (ZOLOFT) 50 mg tablet, Take 2 tablets (100 mg total) by mouth daily at bedtime, Disp: 180 tablet, Rfl: 1  •  zolpidem (AMBIEN) 10 mg tablet, Take 1 tablet (10 mg total) by mouth daily at bedtime as needed for sleep, Disp: 90 tablet, Rfl: 0  •  cyanocobalamin (VITAMIN B-12) 100 mcg tablet, Take 100 mcg by mouth 2 (two) times a week  (Patient not taking: Reported on 6/12/2023), Disp: , Rfl:   •  prednisoLONE acetate (PRED FORTE) 1 % ophthalmic suspension, Administer 1 drop to both eyes 3 (three) times a day (Patient not taking: Reported on 1/23/2024), Disp: 5 mL, Rfl: 2  •  Pren-Fe-Meth-FA-Omeg w/o A (PrimaCare) 30-1-470 MG CAPS, Take by mouth (Patient not taking: Reported on 6/12/2023), Disp: , Rfl:   •  valACYclovir (VALTREX) 500 mg tablet, Take 1 tablet (500 mg total) by mouth daily (Patient not taking: Reported on 1/23/2024), Disp: 90 tablet, Rfl: 1  •  Zinc 50 MG TABS, Take 1 tablet by mouth daily (Patient not taking: Reported on 6/12/2023), Disp: , Rfl:     PMH:   Past Medical History:   Diagnosis  Date   • ADHD     last assessed: 1/13/2016   • Allergic    • Anxiety    • Depression    • Disease of thyroid gland    • GERD with esophagitis     last assessed: 5/10/2018   • Hashimoto's thyroiditis     last assessed: 1/13/2016   • Herpesviral vesicular dermatitis     last assessed: 7/26/2018   • Hyperlipidemia    • IBS (irritable bowel syndrome)     last assessed: 1/13/2016   • Memory loss    • Migraines     last assessed: 1/13/2016   • 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)     last assessed: 5/10/2018   • Traumatic brain injury (HCC)     last assessed: 1/13/2016   • Trigeminal neuralgia     last assessed: 1/13/2016       PSHx:   Past Surgical History:   Procedure Laterality Date   • BREAST BIOPSY Right    • LYMPH NODE BIOPSY     • MAMMO STEREOTACTIC BREAST BIOPSY RIGHT (ALL INC) Right 06/29/2023   • MAMMO STEREOTACTIC BREAST BIOPSY RIGHT (ALL INC) EACH ADD Right 06/29/2023   • ORBITAL RIM RECONSTRUCTION Right    • TONSILLECTOMY Bilateral    • WISDOM TOOTH EXTRACTION         Family History:   Family History   Problem Relation Age of Onset   • Heart disease Mother    • Heart disease Father    • No Known Problems Sister    • No Known Problems Sister    • No Known Problems Maternal Grandmother    • No Known Problems Maternal Grandfather    • No Known Problems Paternal Grandmother    • No Known Problems Paternal Grandfather    • No Known Problems Maternal Aunt    • No Known Problems Maternal Aunt    • No Known Problems Paternal Aunt        Social History:  reports that she quit smoking about 35 years ago. Her smoking use included cigarettes. She started smoking about 48 years ago. She has a 11.5 pack-year smoking history. She has never used smokeless tobacco. She reports that she does not drink alcohol and does not use drugs.          Imaging: I personally reviewed the radiology studies, my impression is as follows: 2-3..      Lab Results   Component Value Date    WBC 4.35 01/03/2024    HGB  "13.1 01/03/2024    HCT 41.1 01/03/2024    MCV 93 01/03/2024     01/03/2024     Lab Results   Component Value Date     07/30/2015    K 4.1 01/03/2024     01/03/2024    CO2 26 01/03/2024    ANIONGAP 9 07/30/2015    BUN 18 01/03/2024    CREATININE 1.00 01/03/2024    GLUCOSE 69 07/30/2015    GLUF 76 04/24/2019    CALCIUM 9.5 01/03/2024    AST 19 01/03/2024    ALT 13 01/03/2024    ALKPHOS 53 01/03/2024    PROT 7.4 07/30/2015    BILITOT 0.30 07/30/2015    EGFR 59 01/03/2024       Vitals:    02/12/24 1053   BP: 110/60   Pulse: 92   Resp: 16   Temp: 98 °F (36.7 °C)   SpO2: 98%          PHYSICAL EXAM  General Appearance:    Alert, cooperative, no distress,    Head:    Normocephalic without obvious abnormality   Eyes:    PERRL, conjunctiva/corneas clear      Neck:   Supple, no adenopathy, no JVD   Back:     Symmetric, no spinal or CVA tenderness   Lungs:     Clear to auscultation bilaterally,   Heart:  Breast:    Regular rate and rhythm, S1 and S2 normal, no murmur    normal appearance, no masses or tenderness, breasts appear normal, no suspicious masses, no skin or nipple changes or axillary nodes.  All the axillary basins are completely benign.    She is exquisitely tender both in the breast and the chest wall in the 8 to 10 o'clock position of the right breast.   Abdomen:     Soft, nontender   Extremities:   Extremities normal. No clubbing, cyanosis or edema   Psych:   Normal Affect, AOx3.    Neurologic:  Skin:   CNII-XII intact. Strength symmetric, speech intact    Warm, dry, intact, no visible rashes or lesions                       Signature:     Tia Leroy MD    Date: 2/12/2024 Time: 11:34 AM                 Some portions of this record may have been generated with voice recognition software. There may be translation, syntax,  or grammatical errors. Occasional wrong word or \"sound-a-like\" substitutions may have occurred due to the inherent limitations of the voice recognition software. Read the " chart carefully and recognize, using context, where substitutions may have occurred. If you have any questions, please contact the dictating provider for clarification or correction, as needed. This encounter has been coded by a physician, non certified coder.

## 2024-02-12 ENCOUNTER — OFFICE VISIT (OUTPATIENT)
Dept: SURGERY | Facility: CLINIC | Age: 64
End: 2024-02-12
Payer: COMMERCIAL

## 2024-02-12 VITALS
RESPIRATION RATE: 16 BRPM | DIASTOLIC BLOOD PRESSURE: 60 MMHG | SYSTOLIC BLOOD PRESSURE: 110 MMHG | HEIGHT: 67 IN | WEIGHT: 135 LBS | OXYGEN SATURATION: 98 % | TEMPERATURE: 98 F | BODY MASS INDEX: 21.19 KG/M2 | HEART RATE: 92 BPM

## 2024-02-12 DIAGNOSIS — N64.4 BREAST PAIN, RIGHT: ICD-10-CM

## 2024-02-12 DIAGNOSIS — R07.81 RIB PAIN ON RIGHT SIDE: ICD-10-CM

## 2024-02-12 PROCEDURE — 99204 OFFICE O/P NEW MOD 45 MIN: CPT | Performed by: SURGERY

## 2024-02-14 ENCOUNTER — TELEPHONE (OUTPATIENT)
Age: 64
End: 2024-02-14

## 2024-02-14 NOTE — TELEPHONE ENCOUNTER
Patient called she saw Dr Cr on Monday and received a referral for an MRI on her breast.  When she got home she realized the paper referral that was given to her was for another patient.  I apologized and asked her to please shred that.  I explained that the electronic referral I son file for her and is attached to her appt that she does not need a paper one.

## 2024-02-21 PROBLEM — J20.8 ACUTE BRONCHITIS DUE TO OTHER SPECIFIED ORGANISMS: Status: RESOLVED | Noted: 2019-06-23 | Resolved: 2024-02-21

## 2024-02-21 NOTE — MISCELLANEOUS
To Whom it May Concern,    Sabrina Bright  1960 is a patient under the care of [facility]  Please be aware she can work up to 25 hours per week, with  a daily hour limitation as she can tolerate  For any further questions please contact our office at [phone]      Sincerely,    Levar Ayoub PA-C         Electronically signed by:Franco Newell [facility]  2016 11:25AM EST

## 2024-02-26 DIAGNOSIS — G47.00 INSOMNIA, UNSPECIFIED TYPE: ICD-10-CM

## 2024-02-26 DIAGNOSIS — G50.0 TRIGEMINAL NEURALGIA: ICD-10-CM

## 2024-02-26 DIAGNOSIS — E06.3 HASHIMOTO'S DISEASE: ICD-10-CM

## 2024-02-26 DIAGNOSIS — Z78.0 MENOPAUSE: ICD-10-CM

## 2024-02-26 RX ORDER — AMITRIPTYLINE HYDROCHLORIDE 10 MG/1
20 TABLET, FILM COATED ORAL
Qty: 180 TABLET | Refills: 1 | Status: SHIPPED | OUTPATIENT
Start: 2024-02-26

## 2024-02-26 RX ORDER — LEVOTHYROXINE SODIUM 0.05 MG/1
50 TABLET ORAL DAILY
Qty: 90 TABLET | Refills: 1 | Status: SHIPPED | OUTPATIENT
Start: 2024-02-26

## 2024-02-26 RX ORDER — ESTROGEN,CON/M-PROGEST ACET 0.3-1.5MG
1 TABLET ORAL DAILY
Qty: 84 TABLET | Refills: 1 | Status: SHIPPED | OUTPATIENT
Start: 2024-02-26

## 2024-02-26 RX ORDER — ZOLPIDEM TARTRATE 10 MG/1
10 TABLET ORAL
Qty: 90 TABLET | Refills: 1 | Status: SHIPPED | OUTPATIENT
Start: 2024-02-26

## 2024-02-26 NOTE — TELEPHONE ENCOUNTER
Reason for call:   [x] Refill   [] Prior Auth  [] Other:     Office:   [x] PCP/Provider -   [] Specialty/Provider -     Medication:   Amitriptyline 10 mg 21tabs nightly  Prempro  daily  Ambien  10 mg nightly  Levothyroxine NOT GENERIC (SYNTROID)     Dose/Frequency: 10 mg 2 tabs at daily/ daily /  nightly     Quantity: 90D w refill    Pharmacy: [facility] [place] on file    Does the patient have enough for 3 days?   [] Yes   [x] No - Send as HP to POD

## 2024-03-12 ENCOUNTER — TELEPHONE (OUTPATIENT)
Age: 64
End: 2024-03-12

## 2024-03-12 NOTE — TELEPHONE ENCOUNTER
Patient is currently scheduled for a mri but her copay is going to be $600.00. she wants to look around for another location to go for the MRI. She is asking for a hard copy of her MRI script to be mailed to her at the address on file so she can take it with her to the appointment once she finds another location out side of [facility].

## 2024-03-19 ENCOUNTER — TELEPHONE (OUTPATIENT)
Age: 64
End: 2024-03-19

## 2024-04-15 PROBLEM — Z12.31 ENCOUNTER FOR SCREENING MAMMOGRAM FOR MALIGNANT NEOPLASM OF BREAST: Status: RESOLVED | Noted: 2022-02-24 | Resolved: 2024-04-15

## 2024-05-21 DIAGNOSIS — Z86.19 HISTORY OF SHINGLES: ICD-10-CM

## 2024-05-21 DIAGNOSIS — F32.0 MAJOR DEPRESSIVE DISORDER, SINGLE EPISODE, MILD (HCC): ICD-10-CM

## 2024-05-21 DIAGNOSIS — G50.0 TRIGEMINAL NEURALGIA: ICD-10-CM

## 2024-05-21 RX ORDER — PREGABALIN 50 MG/1
50 CAPSULE ORAL 2 TIMES DAILY
Qty: 180 CAPSULE | Refills: 1 | Status: SHIPPED | OUTPATIENT
Start: 2024-05-21

## 2024-05-21 RX ORDER — VALACYCLOVIR HYDROCHLORIDE 500 MG/1
500 TABLET, FILM COATED ORAL DAILY
Qty: 90 TABLET | Refills: 1 | Status: CANCELLED | OUTPATIENT
Start: 2024-05-21 | End: 2024-11-17

## 2024-05-21 NOTE — TELEPHONE ENCOUNTER
Script was given 09/29/22 for HSV    Patient was last seen 06/12/23 by for cosmetic treatment     Please advise

## 2024-05-22 NOTE — TELEPHONE ENCOUNTER
Hi All,   I have never seen this patient for this issue or anything else. I believe this was sent to me while I was on call last but this should be sent to Dr. Platt as he was managing this condition and evaluated.  Regards,   Manny

## 2024-05-22 NOTE — TELEPHONE ENCOUNTER
Patient called to check if her medication was sent in yet and I let her know it was sent over to the doctor for approval. She was hoping it could be sent in as soon as possible because she only has one pill left and is in the middle of a flare up.

## 2024-05-23 RX ORDER — VALACYCLOVIR HYDROCHLORIDE 500 MG/1
500 TABLET, FILM COATED ORAL DAILY
Qty: 90 TABLET | Refills: 1 | Status: SHIPPED | OUTPATIENT
Start: 2024-05-23 | End: 2024-11-19

## 2024-05-23 NOTE — TELEPHONE ENCOUNTER
"Rec'd call from patient stating that she is having a flare and is out of her medication. She's wondering what the \"hold up is\". I informed her that we were waiting for provider approval.    She verbalized understanding but would like refill expedited.  "

## 2024-05-24 DIAGNOSIS — G47.00 INSOMNIA, UNSPECIFIED TYPE: ICD-10-CM

## 2024-05-24 RX ORDER — ZOLPIDEM TARTRATE 10 MG/1
10 TABLET ORAL
Qty: 90 TABLET | Refills: 1 | Status: SHIPPED | OUTPATIENT
Start: 2024-05-24

## 2024-05-24 NOTE — TELEPHONE ENCOUNTER
Called and spoke with patient letting her know the valtrex was sent to the pharmacy, she was not happy that it took 4 days.

## 2024-05-24 NOTE — TELEPHONE ENCOUNTER
Patient called very upset how she keeps calling for the past week for her meds to be refilled and now she has no meds and she had to cancel her weekend trip      Very bad flare of rash on face, cheeks and she's afraid it will spread into eyes.    Patient stated that Dr Betts tolda her that she will be the one prescribing her meds moving forward from her last visit and that she doesn't need to be seen by Dr Platt or he should prescribe her meds.    Spoke to Miriam (triage) she advised she'll message the doctor again per notes and nothing else can be done.    Please advise ASAP, patient is very upset and is in need for her meds she explained.

## 2024-05-24 NOTE — TELEPHONE ENCOUNTER
Pt called about adrienneen needs authorization.  Stated had one more refill left, however [facility] could not fill because prior authorization  for the year.    Pt only has 4 pills remaining.    Medication: Zolpidem    Dose/Frequency: 10 mg OD    Quantity: 90    [facility] [place]    Office:   [x] PCP/Provider -   [] Speciality/Provider -     Does the patient have enough for 3 days?   [] Yes   [x] No - Send as HP to POD

## 2024-05-29 ENCOUNTER — TELEPHONE (OUTPATIENT)
Age: 64
End: 2024-05-29

## 2024-05-29 NOTE — TELEPHONE ENCOUNTER
Patient is calling to know the status for the prior authorization for Zolpidem (Ambien) medication. Patient stated if you call it in to the insurance that it will be faster. Please advise.

## 2024-05-31 LAB
25(OH)D3+25(OH)D2 SERPL-MCNC: 35.3 NG/ML (ref 30–100)
ALBUMIN SERPL-MCNC: 4.3 G/DL (ref 3.9–4.9)
ALBUMIN/GLOB SERPL: 1.9 {RATIO} (ref 1.2–2.2)
ALP SERPL-CCNC: 65 IU/L (ref 44–121)
ALT SERPL-CCNC: 15 IU/L (ref 0–32)
AST SERPL-CCNC: 17 IU/L (ref 0–40)
BILIRUB SERPL-MCNC: 0.3 MG/DL (ref 0–1.2)
BUN SERPL-MCNC: 14 MG/DL (ref 8–27)
BUN/CREAT SERPL: 17 (ref 12–28)
CALCIUM SERPL-MCNC: 9.5 MG/DL (ref 8.7–10.3)
CHLORIDE SERPL-SCNC: 102 MMOL/L (ref 96–106)
CHOLEST SERPL-MCNC: 207 MG/DL (ref 100–199)
CO2 SERPL-SCNC: 22 MMOL/L (ref 20–29)
CREAT SERPL-MCNC: 0.83 MG/DL (ref 0.57–1)
EGFR: 79 ML/MIN/1.73
GLOBULIN SER-MCNC: 2.3 G/DL (ref 1.5–4.5)
GLUCOSE SERPL-MCNC: 79 MG/DL (ref 70–99)
HDLC SERPL-MCNC: 82 MG/DL
LDLC SERPL CALC-MCNC: 106 MG/DL (ref 0–99)
POTASSIUM SERPL-SCNC: 4.7 MMOL/L (ref 3.5–5.2)
PROT SERPL-MCNC: 6.6 G/DL (ref 6–8.5)
SL AMB VLDL CHOLESTEROL CALC: 19 MG/DL (ref 5–40)
SODIUM SERPL-SCNC: 138 MMOL/L (ref 134–144)
T4 FREE SERPL-MCNC: 1.2 NG/DL (ref 0.82–1.77)
TRIGL SERPL-MCNC: 110 MG/DL (ref 0–149)
TSH SERPL DL<=0.005 MIU/L-ACNC: 2.92 UIU/ML (ref 0.45–4.5)

## 2024-05-31 NOTE — TELEPHONE ENCOUNTER
PA for Ambien    Submitted via    []CMM-KEY   [x]Kudo-Case ID # [id number]   []Faxed to plan   []Other website   []Phone call Case ID #     Office notes sent, clinical questions answered. Awaiting determination    Turnaround time for your insurance to make a decision on your Prior Authorization can take 7-21 business days.

## 2024-05-31 NOTE — TELEPHONE ENCOUNTER
Patient called again regarding her medications; advised prior janie is jostin worked on; very upset that it's taken this long to get medication as she has only slept for about 2/3 hours in the last week.  She stated she needs her medication and would like a call back when prior auth has been completed so she can get her medication.

## 2024-05-31 NOTE — TELEPHONE ENCOUNTER
Patient called on 5/29/2024 and is calling back today 5/31/2024 to know the status for the prior authorization for Zolpidem (Ambien) medication. Patient stated if you call it in to the insurance that it will be faster. Pt is completely out of med and is having difficulty sleeping. Please advise.

## 2024-06-04 NOTE — TELEPHONE ENCOUNTER
STEPHANIE GABRIEL  Approved     Date(s) approved May 1, 2024 to May 31, 2025         Patient advised by          [] MyChart Message  [] Phone call   [x]LMOM  []L/M to call office as no active Communication consent on file  []Unable to leave detailed message as VM not approved on Communication consent       Pharmacy advised by    [x]Fax  []Phone call    Approval letter scanned into Media Yes

## 2024-06-14 ENCOUNTER — TELEPHONE (OUTPATIENT)
Age: 64
End: 2024-06-14

## 2024-06-14 NOTE — TELEPHONE ENCOUNTER
Pt called to cancel appt in July; could not get in for mammogram and other testing until September; will francisco javier back and schedule

## 2024-08-14 ENCOUNTER — TELEPHONE (OUTPATIENT)
Age: 64
End: 2024-08-14

## 2024-08-19 DIAGNOSIS — Z78.0 MENOPAUSE: ICD-10-CM

## 2024-08-19 DIAGNOSIS — E06.3 HASHIMOTO'S DISEASE: ICD-10-CM

## 2024-08-19 DIAGNOSIS — G50.0 TRIGEMINAL NEURALGIA: ICD-10-CM

## 2024-08-19 RX ORDER — ESTROGEN,CON/M-PROGEST ACET 0.3-1.5MG
1 TABLET ORAL DAILY
Qty: 84 TABLET | Refills: 1 | Status: SHIPPED | OUTPATIENT
Start: 2024-08-19

## 2024-08-19 RX ORDER — LEVOTHYROXINE SODIUM 50 UG/1
50 TABLET ORAL DAILY
Qty: 90 TABLET | Refills: 1 | Status: SHIPPED | OUTPATIENT
Start: 2024-08-19 | End: 2024-08-20 | Stop reason: SDUPTHER

## 2024-08-19 RX ORDER — AMITRIPTYLINE HYDROCHLORIDE 10 MG/1
20 TABLET ORAL
Qty: 180 TABLET | Refills: 1 | Status: SHIPPED | OUTPATIENT
Start: 2024-08-19

## 2024-08-19 NOTE — TELEPHONE ENCOUNTER
Reason for call:   [x] Refill   [] Prior Auth  [x] Other: Patient going away on vacation, Patient mentioned that she would like the Name Brand of levothyroxine (Synthroid) 50 mcg tablet    Office:   [x] PCP/Provider - [facility] - Holly Cardenas DO   [] Specialty/Provider -     Medication: amitriptyline (ELAVIL) 10 mg tablet     Dose/Frequency: Take 2 tablets (20 mg total) by mouth daily at bedtime     Quantity: 180 tablet     Medication: estrogen, conjugated,-medroxyprogesterone (Prempro) 0.3-1.5 MG per tablet     Dose/Frequency: Take 1 tablet by mouth daily     Quantity:  84 tablet     Medication: levothyroxine (Synthroid) 50 mcg tablet     Dose/Frequency: Take 1 tablet (50 mcg total) by mouth daily     Quantity: 90 tablet     Pharmacy: [facility] [address] [place], [place] [address] RISHI BERGMAN [phone]    Does the patient have enough for 3 days?   [x] Yes   [] No - Send as HP to POD

## 2024-08-20 ENCOUNTER — TELEPHONE (OUTPATIENT)
Age: 64
End: 2024-08-20

## 2024-08-20 DIAGNOSIS — E06.3 HASHIMOTO'S DISEASE: ICD-10-CM

## 2024-08-20 RX ORDER — LEVOTHYROXINE SODIUM 50 MCG
50 TABLET ORAL DAILY
Qty: 90 TABLET | Refills: 1 | Status: CANCELLED | OUTPATIENT
Start: 2024-08-20

## 2024-08-20 RX ORDER — LEVOTHYROXINE SODIUM 50 MCG
50 TABLET ORAL DAILY
Qty: 90 TABLET | Refills: 1 | Status: SHIPPED | OUTPATIENT
Start: 2024-08-20

## 2024-08-20 NOTE — TELEPHONE ENCOUNTER
Darby from the pharmacy was calling about synthroid script and how it was written.  States pt needs brand name and a specific coding otherwise she is hit with a large bill.    Shae transferred to [employer] to assist.

## 2024-08-20 NOTE — TELEPHONE ENCOUNTER
Patient called for update on medication stating she just went to the pharmacy and the updated script wasn't there yet , Per Marilu, waiting on doctor to sign off on order . She will reach out once order has been signed and good to go .

## 2024-08-20 NOTE — TELEPHONE ENCOUNTER
"Pt called refill line stating she is currently at [facility] and she lives 25 miles away so she really needs script to be fixed and called in right now. I informed her that I am unable to guarantee a time frame as Dr. Cardenas may be in with pts and/or have a busy inbasket but I would send a HP message in hopes it gets addressed within 24 hours. Pt not happy with response as she wanted Dr. Cardenas to work on script now.    Pt states pharmacy told her her insurance will not cover her script because it was \"sent in with the wrong code\" and it has to say Brand Neccessary or KATHERINE for the brand. I informed her that it appears to have been called in the same way as always so I was a little confused what the issue is but I would send a HP message to Dr. Cardenas and office to look into it and contact the pharmacy if needed.  "

## 2024-09-11 ENCOUNTER — TELEPHONE (OUTPATIENT)
Age: 64
End: 2024-09-11

## 2024-09-11 DIAGNOSIS — K08.89 TOOTH ACHE: Primary | ICD-10-CM

## 2024-09-11 RX ORDER — AZITHROMYCIN 250 MG/1
TABLET, FILM COATED ORAL
Qty: 6 TABLET | Refills: 0 | Status: SHIPPED | OUTPATIENT
Start: 2024-09-11 | End: 2024-09-16

## 2024-09-11 NOTE — TELEPHONE ENCOUNTER
Patient reports she is currently in the process of moving and not able to get in to see a dentist. She has an upper left tooth abscess and is concerned because she is immunocompromised. Patient would like a call back to advise if she can have antibiotics sent to her pharmacy.

## 2024-09-11 NOTE — TELEPHONE ENCOUNTER
Patient called in asking if an abx was prescribed , she called in earlier today with c/o toothache.    Advised Patient that a zpack was sent over to [facility] pharmacy around 2 pm

## 2024-10-11 NOTE — TELEPHONE ENCOUNTER
Rec'd call from patient requesting a follow up appointment with Dr. Platt for a new issue she is experiencing on her face and ears. Offered next available for Dr. Platt as well as other providers in [facility]. Patient declined scheduling at this time and refused to schedule an appointment with her PCP or Urgent Care.  
hard copy, drawn during this pregnancy

## 2024-11-12 NOTE — TELEPHONE ENCOUNTER
Done and sent to the pt
Pt called and left the message. Justyn, yoav, this is Pete Aguilar, Average calling Birth date September 30th, 1960. I need a letter from Doctor Eri Almendarez to excuse me from jury duty. As Doctor Gifford knows, I suffer A traumatic brain injury in 2015 and I have some permanent medical issues because of that. So there there's no way that I could be able to sit for jury duty for six or seven hours.
You can access the FollowMyHealth Patient Portal offered by [facility] by registering at the following website: [email or web address]. By joining [facility]’s FollowMyHealth portal, you will also be able to view your health information using other applications (apps) compatible with our system.

## 2024-11-23 DIAGNOSIS — G47.00 INSOMNIA, UNSPECIFIED TYPE: ICD-10-CM

## 2024-11-25 RX ORDER — ZOLPIDEM TARTRATE 10 MG/1
10 TABLET ORAL
Qty: 90 TABLET | Refills: 0 | Status: SHIPPED | OUTPATIENT
Start: 2024-11-25

## 2024-11-25 NOTE — TELEPHONE ENCOUNTER
Patient called the office back to let the provider know that she doesn't wish to come in for a appointment at this time. Pt states she will come in for a physical when the time is due.

## 2024-11-25 NOTE — TELEPHONE ENCOUNTER
Pt called for a refill on the Ambien. I explained that the pharmacy contacted us and we are waiting for the approval. Pt understood

## 2024-11-29 DIAGNOSIS — Z86.19 HISTORY OF SHINGLES: ICD-10-CM

## 2024-11-29 NOTE — TELEPHONE ENCOUNTER
Reason for call:   [x] Refill   [] Prior Auth  [] Other:     Office:   [] PCP/Provider -   [x] Specialty/Provider - Derm    Medication: Valacyclovir    Dose/Frequency: 500 mg daily    Quantity: 90    Pharmacy: [facility] on file     Does the patient have enough for 3 days?   [] Yes   [x] No - Send as HP to POD

## 2024-12-03 NOTE — TELEPHONE ENCOUNTER
Patient called asking for the status on the meds refill request/advised of notes   Patient asked to be scheduled but our first available is not until April  Scheduled patient with Dr Platt 4/2/2025 in [place]  Offered CV first available 12/23 and 12/30 patient had other appointments those on those days and patient is down to 2 pills and need her meds.  If you can please advise    Thank you

## 2024-12-08 RX ORDER — VALACYCLOVIR HYDROCHLORIDE 500 MG/1
500 TABLET, FILM COATED ORAL DAILY
Qty: 90 TABLET | Refills: 1 | Status: SHIPPED | OUTPATIENT
Start: 2024-12-08 | End: 2025-06-06

## 2024-12-24 ENCOUNTER — OFFICE VISIT (OUTPATIENT)
Dept: URGENT CARE | Facility: MEDICAL CENTER | Age: 64
End: 2024-12-24
Payer: COMMERCIAL

## 2024-12-24 ENCOUNTER — APPOINTMENT (OUTPATIENT)
Dept: RADIOLOGY | Facility: MEDICAL CENTER | Age: 64
End: 2024-12-24
Payer: COMMERCIAL

## 2024-12-24 VITALS
TEMPERATURE: 97.6 F | SYSTOLIC BLOOD PRESSURE: 132 MMHG | HEIGHT: 68 IN | OXYGEN SATURATION: 98 % | RESPIRATION RATE: 20 BRPM | WEIGHT: 122 LBS | BODY MASS INDEX: 18.49 KG/M2 | DIASTOLIC BLOOD PRESSURE: 70 MMHG | HEART RATE: 104 BPM

## 2024-12-24 DIAGNOSIS — R05.1 ACUTE COUGH: ICD-10-CM

## 2024-12-24 DIAGNOSIS — J98.8 RESPIRATORY INFECTION: Primary | ICD-10-CM

## 2024-12-24 PROCEDURE — 99214 OFFICE O/P EST MOD 30 MIN: CPT

## 2024-12-24 PROCEDURE — 71046 X-RAY EXAM CHEST 2 VIEWS: CPT

## 2024-12-24 RX ORDER — AZITHROMYCIN 250 MG/1
TABLET, FILM COATED ORAL
Qty: 6 TABLET | Refills: 0 | Status: SHIPPED | OUTPATIENT
Start: 2024-12-24 | End: 2024-12-28

## 2024-12-24 RX ORDER — AZITHROMYCIN 250 MG/1
TABLET, FILM COATED ORAL
Qty: 6 TABLET | Refills: 0 | Status: SHIPPED | OUTPATIENT
Start: 2024-12-26 | End: 2024-12-24 | Stop reason: CLARIF

## 2024-12-24 NOTE — PATIENT INSTRUCTIONS
YOU SHOULD SIGN UP FOR YOUR MYCHART TO VIEW YOUR TEST RESULTS.     Your Chest Xray appears Normal. -Radiology will provide an official read on your xray.

## 2024-12-24 NOTE — PROGRESS NOTES
"  [facility]        NAME: Lynnette Hewitt is a 64 y.o. female  : 1960    MRN: [id number]  DATE: 2024  TIME: 12:03 PM    Assessment and Plan   Respiratory infection [J98.8]  1. Respiratory infection  azithromycin (ZITHROMAX) 250 mg tablet    DISCONTINUED: azithromycin (ZITHROMAX) 250 mg tablet    CANCELED: Covid/Flu- Office Collect Normal      2. Acute cough  XR chest pa and lateral            Patient Instructions       Follow up with PCP in 3-5 days.  Proceed to  ER if symptoms worsen.    If tests are performed, our office will contact you with results only if changes need to made to the care plan discussed with you at the visit. [facility] on [facility]    Chief Complaint     Chief Complaint   Patient presents with   • Cough     Cough x 1 week          History of Present Illness       Patient here with reports of onset 1wk ago with sore throat, fever, chills, and coughing. Last week continued to have symptoms until . The past 3 days she has now had a productive cough. She does have genetic immune disorders. At home she has been taking tylenol and benadryl for her symptoms- last dose was tylenol and benadryl last night prior to bed. Patient reports she does have a friend who was told he has the flu. She understands that this is viral and has to run it's course however she is concerned due to being immunocompromised that she will have problems.  Patient reports she was recently at an eye doctor who insisted she remove her mask for exam and at that time she feels as when she was exposed and has now developed worsening symptoms.  She reports in the past she has been told by her immunologist to not go out without a mask or remove it when she is out and reports \"I guarantee if I do not get antibiotics I will be hospitalized next week\".  I have had a conversation with her regarding viral illnesses and using antibiotics.  I have instructed her to " "follow-up with her primary care provider/immunologist.  While I feel her symptoms are viral in nature given her insistent nature and concerns for her health with her \"immunocompromise state\" we will provide Rx for azithromycin for respiratory infection.  She has refused a COVID flu swab, as this would require her to remove her mask and told the nurse \"I will die if I remove the mask\".  She reports she did do a COVID swab at home which was negative. I have explained to her that I feel her symptoms are viral in nature. She did request a paper Rx due to she reports she is here from 'out of the area'.         Review of Systems   Review of Systems   Constitutional:  Positive for chills, fatigue and fever.        Last Thursday last time she had a fever   HENT:  Positive for congestion, rhinorrhea, sinus pressure, sinus pain and sore throat. Negative for trouble swallowing.         Sore throat resolved.    Respiratory:  Positive for cough. Negative for chest tightness and shortness of breath.    Cardiovascular:  Negative for chest pain and palpitations.   Gastrointestinal:  Negative for abdominal pain, constipation, diarrhea, nausea and vomiting.   Skin:  Negative for color change and rash.   Neurological:  Negative for dizziness, light-headedness and headaches.   All other systems reviewed and are negative.        Current Medications       Current Outpatient Medications:   •  acetaminophen (TYLENOL) 325 mg tablet, Take 650 mg by mouth daily at bedtime , Disp: , Rfl:   •  amitriptyline (ELAVIL) 10 mg tablet, Take 2 tablets (20 mg total) by mouth daily at bedtime, Disp: 180 tablet, Rfl: 1  •  azelastine (ASTELIN) 0.1 % nasal spray, 1 spray into each nostril 2 (two) times a day Use in each nostril as directed, Disp: , Rfl:   •  azithromycin (ZITHROMAX) 250 mg tablet, Take 2 tablets today then 1 tablet daily x 4 days, Disp: 6 tablet, Rfl: 0  •  cholecalciferol (VITAMIN D3) 1,000 units tablet, Take 2,000 Units by mouth daily , " Disp: , Rfl:   •  Cimetidine (TAGAMET PO), Take 1 tablet by mouth as needed (gerd), Disp: , Rfl:   •  clobetasol (OLUX) 0.05 % topical foam, Apply on scalp for itchy rash, Disp: 50 g, Rfl: 4  •  cyanocobalamin (VITAMIN B-12) 100 mcg tablet, Take 100 mcg by mouth 2 (two) times a week, Disp: , Rfl:   •  estrogen, conjugated,-medroxyprogesterone (Prempro) 0.3-1.5 MG per tablet, Take 1 tablet by mouth daily, Disp: 84 tablet, Rfl: 1  •  loratadine (CLARITIN) 10 mg tablet, Take 10 mg by mouth daily, Disp: , Rfl:   •  pregabalin (LYRICA) 50 mg capsule, Take 1 capsule (50 mg total) by mouth 2 (two) times a day, Disp: 180 capsule, Rfl: 1  •  rizatriptan (MAXALT) 5 MG tablet, Take 5 mg by mouth as needed , Disp: , Rfl:   •  sertraline (ZOLOFT) 50 mg tablet, Take 2 tablets (100 mg total) by mouth daily at bedtime, Disp: 180 tablet, Rfl: 1  •  Synthroid 50 MCG tablet, Take 1 tablet (50 mcg total) by mouth daily, Disp: 90 tablet, Rfl: 1  •  valACYclovir (VALTREX) 500 mg tablet, Take 1 tablet (500 mg total) by mouth daily, Disp: 90 tablet, Rfl: 1  •  Zinc 50 MG TABS, Take 1 tablet by mouth daily, Disp: , Rfl:   •  zolpidem (AMBIEN) 10 mg tablet, TAKE 1 TABLET BY MOUTH ONCE DAILY AT BEDTIME AS NEEDED FOR SLEEP, Disp: 90 tablet, Rfl: 0  •  ketoconazole (NIZORAL) 2 % shampoo, Apply and wash twice weekly on scalp, Disp: 100 mL, Rfl: 11  •  minoxidil (LONITEN) 2.5 mg tablet, Take 0.5 tablets (1.25 mg total) by mouth daily, Disp: 90 tablet, Rfl: 1  •  minoxidil (ROGAINE) 2 % external solution, Apply topically 2 (two) times a day, Disp: 30 mL, Rfl: 6  •  prednisoLONE acetate (PRED FORTE) 1 % ophthalmic suspension, Administer 1 drop to both eyes 3 (three) times a day (Patient not taking: Reported on 12/24/2024), Disp: 5 mL, Rfl: 2  •  Pren-Fe-Meth-FA-Omeg w/o A (PrimaCare) 30-1-470 MG CAPS, Take by mouth (Patient not taking: Reported on 12/24/2024), Disp: , Rfl:   •  Synthroid 50 MCG tablet, Take 1 tablet (50 mcg total) by mouth daily,  Disp: 90 tablet, Rfl: 1    Current Allergies     Allergies as of 12/24/2024 - Reviewed 12/24/2024   Allergen Reaction Noted   • Cleocin  [clindamycin hcl] GI Intolerance 11/18/2019   • Clindamycin GI Intolerance and Vomiting 04/21/2012            The following portions of the patient's history were reviewed and updated as appropriate: allergies, current medications, past family history, past medical history, past social history, past surgical history and problem list.     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)     last assessed: 5/10/2018   • Traumatic brain injury (HCC)     last assessed: 1/13/2016   • Trigeminal neuralgia     last assessed: 1/13/2016       Past Surgical History:   Procedure Laterality Date   • BREAST BIOPSY Right    • LYMPH NODE BIOPSY     • MAMMO STEREOTACTIC BREAST BIOPSY RIGHT (ALL INC) Right 06/29/2023   • MAMMO STEREOTACTIC BREAST BIOPSY RIGHT (ALL INC) EACH ADD Right 06/29/2023   • ORBITAL RIM RECONSTRUCTION Right    • TONSILLECTOMY Bilateral    • WISDOM TOOTH EXTRACTION         Family History   Problem Relation Age of Onset   • Heart disease Mother    • Heart disease Father    • No Known Problems Sister    • No Known Problems Sister    • No Known Problems Maternal Grandmother    • No Known Problems Maternal Grandfather    • No Known Problems Paternal Grandmother    • No Known Problems Paternal Grandfather    • No Known  "Problems Maternal Aunt    • No Known Problems Maternal Aunt    • No Known Problems Paternal Aunt          Medications have been verified.        Objective   /70   Pulse 104   Temp 97.6 °F (36.4 °C)   Resp 20   Ht 5' 8\" (1.727 m)   Wt 55.3 kg (122 lb)   SpO2 98%   BMI 18.55 kg/m²        Physical Exam     Physical Exam  Vitals and nursing note reviewed.   Constitutional:       General: She is not in acute distress.     Appearance: Normal appearance. She is normal weight. She is not ill-appearing.   HENT:      Head: Normocephalic and atraumatic.      Right Ear: Tympanic membrane, ear canal and external ear normal.      Left Ear: Tympanic membrane, ear canal and external ear normal.      Nose:      Comments: Patient declined exam. Refused to remove N95 Mask. (Provider was wearing mask while doing exam.      Mouth/Throat:      Comments: Patient wearing mask, refusing oral exam due to concerns of being immunocompromised. Patient declined exam. Refused to remove N95 Mask. (Provider was wearing mask while doing exam.   Eyes:      General: Lids are normal.      Extraocular Movements: Extraocular movements intact.      Conjunctiva/sclera: Conjunctivae normal.      Pupils: Pupils are equal, round, and reactive to light.   Cardiovascular:      Rate and Rhythm: Normal rate and regular rhythm.      Pulses: Normal pulses.      Heart sounds: Normal heart sounds.   Pulmonary:      Effort: Pulmonary effort is normal.      Breath sounds: Normal breath sounds.   Abdominal:      General: Abdomen is flat. Bowel sounds are normal.      Palpations: Abdomen is soft.   Musculoskeletal:         General: Normal range of motion.      Cervical back: Full passive range of motion without pain, normal range of motion and neck supple.   Skin:     General: Skin is warm and dry.      Capillary Refill: Capillary refill takes less than 2 seconds.      Findings: No rash.   Neurological:      General: No focal deficit present.      Mental " Status: She is alert and oriented to person, place, and time.   Psychiatric:         Mood and Affect: Mood normal.         Behavior: Behavior normal. Behavior is cooperative.

## 2025-02-04 ENCOUNTER — TELEPHONE (OUTPATIENT)
Age: 65
End: 2025-02-04

## 2025-02-04 ENCOUNTER — OFFICE VISIT (OUTPATIENT)
Dept: FAMILY MEDICINE CLINIC | Facility: CLINIC | Age: 65
End: 2025-02-04
Payer: COMMERCIAL

## 2025-02-04 VITALS
OXYGEN SATURATION: 98 % | HEIGHT: 68 IN | SYSTOLIC BLOOD PRESSURE: 118 MMHG | DIASTOLIC BLOOD PRESSURE: 80 MMHG | BODY MASS INDEX: 18.19 KG/M2 | WEIGHT: 120 LBS | RESPIRATION RATE: 16 BRPM | HEART RATE: 93 BPM | TEMPERATURE: 98.5 F

## 2025-02-04 DIAGNOSIS — Z12.31 ENCOUNTER FOR SCREENING MAMMOGRAM FOR BREAST CANCER: ICD-10-CM

## 2025-02-04 DIAGNOSIS — E06.3 HASHIMOTO'S DISEASE: ICD-10-CM

## 2025-02-04 DIAGNOSIS — Z00.00 HEALTH CARE MAINTENANCE: Primary | ICD-10-CM

## 2025-02-04 DIAGNOSIS — Z78.0 POSTMENOPAUSAL: ICD-10-CM

## 2025-02-04 DIAGNOSIS — Z78.0 MENOPAUSE: ICD-10-CM

## 2025-02-04 DIAGNOSIS — E55.9 VITAMIN D DEFICIENCY: ICD-10-CM

## 2025-02-04 DIAGNOSIS — E06.3 HASHIMOTO'S THYROIDITIS: ICD-10-CM

## 2025-02-04 DIAGNOSIS — S06.9X9D TRAUMATIC BRAIN INJURY WITH LOSS OF CONSCIOUSNESS, SUBSEQUENT ENCOUNTER: ICD-10-CM

## 2025-02-04 DIAGNOSIS — Q24.8 PERICARDIAL CYST: ICD-10-CM

## 2025-02-04 DIAGNOSIS — G47.00 INSOMNIA, UNSPECIFIED TYPE: ICD-10-CM

## 2025-02-04 DIAGNOSIS — K21.9 GASTROESOPHAGEAL REFLUX DISEASE WITHOUT ESOPHAGITIS: ICD-10-CM

## 2025-02-04 DIAGNOSIS — G50.0 TRIGEMINAL NEURALGIA: ICD-10-CM

## 2025-02-04 DIAGNOSIS — J45.20 MILD INTERMITTENT ASTHMA WITHOUT COMPLICATION: ICD-10-CM

## 2025-02-04 DIAGNOSIS — D83.9 COMMON VARIABLE IMMUNODEFICIENCY (HCC): ICD-10-CM

## 2025-02-04 DIAGNOSIS — Z13.820 SCREENING FOR OSTEOPOROSIS: ICD-10-CM

## 2025-02-04 DIAGNOSIS — Z13.820 ENCOUNTER FOR SCREENING FOR OSTEOPOROSIS: ICD-10-CM

## 2025-02-04 PROCEDURE — 99397 PER PM REEVAL EST PAT 65+ YR: CPT | Performed by: FAMILY MEDICINE

## 2025-02-04 RX ORDER — LEVOTHYROXINE SODIUM 50 MCG
50 TABLET ORAL DAILY
Qty: 90 TABLET | Refills: 3 | Status: SHIPPED | OUTPATIENT
Start: 2025-02-04

## 2025-02-04 RX ORDER — ZOLPIDEM TARTRATE 10 MG/1
10 TABLET ORAL
Qty: 90 TABLET | Refills: 3 | Status: SHIPPED | OUTPATIENT
Start: 2025-02-04

## 2025-02-04 RX ORDER — ESTROGEN,CON/M-PROGEST ACET 0.3-1.5MG
1 TABLET ORAL DAILY
Qty: 84 TABLET | Refills: 2 | Status: SHIPPED | OUTPATIENT
Start: 2025-02-04

## 2025-02-04 RX ORDER — AMITRIPTYLINE HYDROCHLORIDE 10 MG/1
20 TABLET ORAL
Qty: 180 TABLET | Refills: 3 | Status: SHIPPED | OUTPATIENT
Start: 2025-02-04

## 2025-02-04 RX ORDER — PREGABALIN 50 MG/1
50 CAPSULE ORAL 2 TIMES DAILY
Qty: 180 CAPSULE | Refills: 3 | Status: SHIPPED | OUTPATIENT
Start: 2025-02-04

## 2025-02-04 NOTE — ASSESSMENT & PLAN NOTE
Sees immunologist specialist - Dr. Bhat at [place], [place] location    Patient was tested for vaccinations. Test Ig levels and sub class IGG levels. Had issues with pneumococcal vaccine and developed cellulitis from it. May need to start monthly IV IG as per patient.

## 2025-02-04 NOTE — ASSESSMENT & PLAN NOTE
stable  Orders:    pregabalin (LYRICA) 50 mg capsule; Take 1 capsule (50 mg total) by mouth 2 (two) times a day    amitriptyline (ELAVIL) 10 mg tablet; Take 2 tablets (20 mg total) by mouth daily at bedtime

## 2025-02-04 NOTE — PATIENT INSTRUCTIONS
Here for general PE and is on valtrex for HSV and is to take ambien prn sleep and also rec eating healthy and exercising and get at least 8 hours sleep. Use sunscreen and monitor for ticks. See GYN and get mammograms as directed and colon screenings as directed. Check labs. Refilled all meds as directed.

## 2025-02-04 NOTE — ASSESSMENT & PLAN NOTE
Needs ambien daily prn sleep  Orders:    zolpidem (AMBIEN) 10 mg tablet; Take 1 tablet (10 mg total) by mouth daily at bedtime as needed for sleep

## 2025-02-04 NOTE — PROGRESS NOTES
Adult Annual Physical  Name: Lynnette Hewitt      : 1960      MRN: [id number]  Encounter Provider: Holly Cardenas DO  Encounter Date: 2025   Encounter department: [facility]  Chief Complaint   Patient presents with    Well Check     Patient Instructions   Here for general PE and is on valtrex for HSV and is to take ambien prn sleep and also rec eating healthy and exercising and get at least 8 hours sleep. Use sunscreen and monitor for ticks. See GYN and get mammograms as directed and colon screenings as directed. Check labs. Refilled all meds as directed.     Assessment & Plan  Health care maintenance  UTD and see GYN and get mammogram and colon screenings as directed.   Orders:    Comprehensive metabolic panel; Future    CBC and differential; Future    Lipid Panel with Direct LDL reflex; Future    TSH, 3rd generation; Future    T4, free; Future    Vitamin D 25 hydroxy; Future    Encounter for screening mammogram for breast cancer    Orders:    Mammo screening bilateral w 3d and cad; Future    Encounter for screening for osteoporosis  Needs dexa scan  Orders:    DXA bone density spine hip and pelvis; Future    Traumatic brain injury with loss of consciousness, subsequent encounter  stable       Common variable immunodeficiency (HCC)  Sees immunologist specialist - Dr. Bhat at [place], [place] location    Patient was tested for vaccinations. Test Ig levels and sub class IGG levels. Had issues with pneumococcal vaccine and developed cellulitis from it. May need to start monthly IV IG as per patient.        Trigeminal neuralgia  stable  Orders:    pregabalin (LYRICA) 50 mg capsule; Take 1 capsule (50 mg total) by mouth 2 (two) times a day    amitriptyline (ELAVIL) 10 mg tablet; Take 2 tablets (20 mg total) by mouth daily at bedtime    Hashimoto's thyroiditis  Stable and takes med  Orders:    Comprehensive metabolic panel; Future    Lipid Panel with Direct LDL reflex; Future    TSH,  3rd generation; Future    T4, free; Future    Insomnia, unspecified type  Needs ambien daily prn sleep  Orders:    zolpidem (AMBIEN) 10 mg tablet; Take 1 tablet (10 mg total) by mouth daily at bedtime as needed for sleep    Vitamin D deficiency  Check level  Orders:    Comprehensive metabolic panel; Future    Vitamin D 25 hydroxy; Future    Gastroesophageal reflux disease without esophagitis  Sees GI Dr. Nino       Screening for osteoporosis  Needs dexa scan        Postmenopausal  Check dexa scan       Menopause  Check dexa scan  Orders:    estrogen, conjugated,-medroxyprogesterone (Prempro) 0.3-1.5 MG per tablet; Take 1 tablet by mouth daily    Hashimoto's disease  Await thyroid labs  Orders:    TSH, 3rd generation; Future    T4, free; Future    Synthroid 50 MCG tablet; Take 1 tablet (50 mcg total) by mouth daily    Pericardial cyst         Mild intermittent asthma without complication         Immunizations and preventive care screenings were discussed with patient today. Appropriate education was printed on patient's after visit summary.    Counseling:  Alcohol/drug use: discussed moderation in alcohol intake, the recommendations for healthy alcohol use, and avoidance of illicit drug use.  Dental Health: discussed importance of regular tooth brushing, flossing, and dental visits.  Injury prevention: discussed safety/seat belts, safety helmets, smoke detectors, carbon monoxide detectors, and smoking near bedding or upholstery.  Sexual health: discussed sexually transmitted diseases, partner selection, use of condoms, avoidance of unintended pregnancy, and contraceptive alternatives.  Exercise: the importance of regular exercise/physical activity was discussed. Recommend exercise 3-5 times per week for at least 30 minutes.          History of Present Illness     Adult Annual Physical:  Patient presents for annual physical. 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 Also a . Patient does eat healthy and does not exercise currently. Patient does sleep at least 8 hours per week and migraines decreased after retiring. On pregablin. No children and is non smoker and does not drink alcohol. Does see immunologist as directed and told she suffers from asthma. Likely due to environmental - had high levels of toxic mold and formaldehyde and volatile organic compounds. Had asthma attack and was put on rescue inhaler and steroid in past. Told to move and left her home before Thanksgiving. Patient did leave and is looking for a new place to live. Avoids gluten and sugar. Sees GYN and gets mammograms as directed. Patient is on ambien prn sleep. Approval over phone to [employer]. Patient told to call [phone] and needs prior approval and justification and did go through step therapy Lunesta and Temazepam and also needs to have physician to override quantity limits due to medical condition with traumatic brain injury. Had flu in January and resolved. Sees GYN and gets mammograms.     Diet and Physical Activity:  - Diet/Nutrition: well balanced diet.  - Exercise: no formal exercise.    Depression Screening:    - PHQ-9 Score: 0    General Health:  - Sleep: sleeps well and > 8 hours of sleep on average.  - Hearing: normal hearing right ear and normal hearing left ear.  - Vision: goes for regular eye exams.  - Dental: regular dental visits.    /GYN Health:  - Follows with GYN: yes.   - Menopause: postmenopausal.     Review of Systems   Constitutional: Negative.    HENT: Negative.     Eyes: Negative.    Respiratory: Negative.     Cardiovascular: Negative.    Gastrointestinal: Negative.    Endocrine: Negative.    Genitourinary: Negative.    Musculoskeletal: Negative.    Skin: Negative.    Allergic/Immunologic: Negative.    Neurological: Negative.    Hematological: Negative.    Psychiatric/Behavioral: Negative.        Current Outpatient Medications on File Prior to Visit   Medication Sig Dispense Refill    acetaminophen (TYLENOL) 325 mg tablet Take 650 mg by mouth daily at bedtime       Synthroid 50 MCG tablet Take 1 tablet (50 mcg total) by mouth daily 90 tablet 1    valACYclovir (VALTREX) 500 mg tablet Take 1 tablet (500 mg total) by mouth daily 90 tablet 1    [DISCONTINUED] amitriptyline (ELAVIL) 10 mg tablet Take 2 tablets (20 mg total) by mouth daily at bedtime 180 tablet 1    [DISCONTINUED] estrogen, conjugated,-medroxyprogesterone (Prempro) 0.3-1.5 MG per tablet Take 1 tablet by mouth daily 84 tablet 1    [DISCONTINUED] pregabalin (LYRICA) 50 mg capsule Take 1 capsule (50 mg total) by mouth 2 (two) times a day 180 capsule 1    [DISCONTINUED] Synthroid 50 MCG tablet Take 1 tablet (50 mcg total) by mouth daily 90 tablet 1    [DISCONTINUED] zolpidem (AMBIEN) 10 mg tablet TAKE 1 TABLET BY MOUTH ONCE DAILY AT BEDTIME AS NEEDED FOR SLEEP 90 tablet 0    minoxidil (LONITEN) 2.5 mg tablet Take 0.5 tablets (1.25 mg total) by mouth daily 90 tablet 1    prednisoLONE acetate (PRED FORTE) 1 % ophthalmic suspension Administer 1 drop to both eyes 3 (three) times a day (Patient not taking: Reported on 1/23/2024) 5 mL 2    [DISCONTINUED] azelastine (ASTELIN) 0.1 % nasal spray 1 spray into each nostril 2 (two) times a day Use in each nostril as directed (Patient not taking: Reported on 2/4/2025)      [DISCONTINUED] cholecalciferol (VITAMIN D3) 1,000 units tablet Take 2,000 Units by mouth daily  (Patient not taking: Reported on 2/4/2025)      [DISCONTINUED] Cimetidine (TAGAMET PO) Take 1 tablet by mouth as needed (gerd) (Patient not taking: Reported on 2/4/2025)      [DISCONTINUED] clobetasol (OLUX) 0.05 % topical foam Apply on scalp for itchy rash (Patient not taking: Reported on 2/4/2025) 50 g 4    [DISCONTINUED] cyanocobalamin (VITAMIN B-12) 100 mcg tablet Take 100 mcg by mouth 2 (two) times a week (Patient not taking: Reported on  "2/4/2025)      [DISCONTINUED] ketoconazole (NIZORAL) 2 % shampoo Apply and wash twice weekly on scalp (Patient not taking: Reported on 2/4/2025) 100 mL 11    [DISCONTINUED] loratadine (CLARITIN) 10 mg tablet Take 10 mg by mouth daily (Patient not taking: Reported on 2/4/2025)      [DISCONTINUED] minoxidil (ROGAINE) 2 % external solution Apply topically 2 (two) times a day (Patient not taking: Reported on 2/4/2025) 30 mL 6    [DISCONTINUED] Pren-Fe-Meth-FA-Omeg w/o A (PrimaCare) 30-1-470 MG CAPS Take by mouth (Patient not taking: Reported on 6/12/2023)      [DISCONTINUED] rizatriptan (MAXALT) 5 MG tablet Take 5 mg by mouth as needed  (Patient not taking: Reported on 2/4/2025)      [DISCONTINUED] sertraline (ZOLOFT) 50 mg tablet Take 2 tablets (100 mg total) by mouth daily at bedtime (Patient not taking: Reported on 2/4/2025) 180 tablet 1    [DISCONTINUED] Zinc 50 MG TABS Take 1 tablet by mouth daily (Patient not taking: Reported on 2/4/2025)       No current facility-administered medications on file prior to visit.        Objective   /80   Pulse 93   Temp 98.5 °F (36.9 °C) (Temporal)   Resp 16   Ht 5' 8\" (1.727 m)   Wt 54.4 kg (120 lb)   SpO2 98%   BMI 18.25 kg/m²     Physical Exam  Constitutional:       Appearance: Normal appearance. She is well-developed.   HENT:      Head: Normocephalic and atraumatic.      Right Ear: External ear normal.      Left Ear: External ear normal.      Nose: Nose normal.      Mouth/Throat:      Mouth: Mucous membranes are moist.   Eyes:      Conjunctiva/sclera: Conjunctivae normal.      Pupils: Pupils are equal, round, and reactive to light.   Cardiovascular:      Rate and Rhythm: Normal rate and regular rhythm.      Pulses: Normal pulses.      Heart sounds: Normal heart sounds.   Pulmonary:      Effort: Pulmonary effort is normal.      Breath sounds: Normal breath sounds.   Abdominal:      General: Abdomen is flat. Bowel sounds are normal.      Palpations: Abdomen is soft. "   Musculoskeletal:         General: Normal range of motion.      Cervical back: Normal range of motion and neck supple.   Skin:     General: Skin is warm and dry.      Capillary Refill: Capillary refill takes less than 2 seconds.   Neurological:      General: No focal deficit present.      Mental Status: She is alert and oriented to person, place, and time. Mental status is at baseline.      Deep Tendon Reflexes: Reflexes are normal and symmetric.   Psychiatric:         Mood and Affect: Mood normal.         Behavior: Behavior normal.         Thought Content: Thought content normal.         Judgment: Judgment normal.       Administrative Statements   I have spent a total time of 35   minutes in caring for this patient on the day of the visit/encounter including Diagnostic results, Prognosis, Risks and benefits of tx options, Instructions for management, Patient and family education, Importance of tx compliance, Risk factor reductions, Impressions, Counseling / Coordination of care, Documenting in the medical record, Reviewing / ordering tests, medicine, procedures  , and Obtaining or reviewing history  .

## 2025-02-04 NOTE — ASSESSMENT & PLAN NOTE
Stable and takes med  Orders:    Comprehensive metabolic panel; Future    Lipid Panel with Direct LDL reflex; Future    TSH, 3rd generation; Future    T4, free; Future

## 2025-03-13 ENCOUNTER — TELEPHONE (OUTPATIENT)
Age: 65
End: 2025-03-13

## 2025-03-13 NOTE — TELEPHONE ENCOUNTER
The patient was seen today by her primary care physician, Dr. Cardenas who agreed to take over the patients dermatology prescriptions since they are long term prescriptions. This is regarding the patients valACYclovir (VALTREX) 500 mg tablets and the minoxidil (LONITEN) 2.5 mg tablet.    She was advised to contact us and confirm that this is ok with our doctors first.     Patient says it would be easier on her if Dr. Cardenas managed her prescriptions as the medicines seem to be working and she isn't having any issues. Patient will follow up with us on an as-needed basis.

## 2025-03-18 NOTE — TELEPHONE ENCOUNTER
Called patient to let her know that Dr. Platt is ok with her PCP managing her dermatology prescriptions. She cancelled her upcoming appointment and will follow up as needed.

## 2025-05-02 ENCOUNTER — TELEPHONE (OUTPATIENT)
Age: 65
End: 2025-05-02

## 2025-05-02 NOTE — TELEPHONE ENCOUNTER
Patient called in patient spoke with dermatology and said it was ok for Primary Care Provider to take over prescribing medication to patient   Patient is requesting scripts for     valACYclovir (VALTREX) 500 mg tablet     minoxidil (LONITEN) 2.5 mg tablet   Please Advise

## 2025-05-03 DIAGNOSIS — Z86.19 HISTORY OF SHINGLES: ICD-10-CM

## 2025-05-03 DIAGNOSIS — L65.9 HAIR LOSS: ICD-10-CM

## 2025-05-03 RX ORDER — VALACYCLOVIR HYDROCHLORIDE 500 MG/1
500 TABLET, FILM COATED ORAL DAILY
Qty: 90 TABLET | Refills: 1 | Status: SHIPPED | OUTPATIENT
Start: 2025-05-03 | End: 2025-10-30

## 2025-05-03 RX ORDER — MINOXIDIL 2.5 MG/1
1.25 TABLET ORAL DAILY
Qty: 90 TABLET | Refills: 1 | Status: SHIPPED | OUTPATIENT
Start: 2025-05-03 | End: 2026-04-28

## 2025-05-22 ENCOUNTER — TELEPHONE (OUTPATIENT)
Age: 65
End: 2025-05-22

## 2025-05-22 NOTE — TELEPHONE ENCOUNTER
Pt has stuck medicated tampon in, no urgent visits seen on schedule. Attempted warm transfer, but unable speak to someone, message sent for assistance. Pt cannot come in until tomorrow.5/23/25. I did recommend patient to be seen sooner than later, she verbalized understanding, but requests tomorrow.

## 2025-05-23 ENCOUNTER — OFFICE VISIT (OUTPATIENT)
Dept: OBGYN CLINIC | Facility: CLINIC | Age: 65
End: 2025-05-23
Payer: COMMERCIAL

## 2025-05-23 VITALS
WEIGHT: 122.4 LBS | BODY MASS INDEX: 18.55 KG/M2 | DIASTOLIC BLOOD PRESSURE: 64 MMHG | HEIGHT: 68 IN | SYSTOLIC BLOOD PRESSURE: 112 MMHG

## 2025-05-23 DIAGNOSIS — T19.2XXA RETAINED TAMPON, INITIAL ENCOUNTER: Primary | ICD-10-CM
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
## 2025-05-23 DIAGNOSIS — N76.0 RECURRENT VAGINITIS: ICD-10-CM

## 2025-05-23 DIAGNOSIS — D84.9 IMMUNOCOMPROMISED (HCC): ICD-10-CM

## 2025-05-23 PROCEDURE — 99213 OFFICE O/P EST LOW 20 MIN: CPT | Performed by: OBSTETRICS & GYNECOLOGY

## 2025-05-23 RX ORDER — FLUCONAZOLE 150 MG/1
TABLET ORAL
Qty: 30 TABLET | Refills: 1 | Status: SHIPPED | OUTPATIENT
Start: 2025-05-23 | End: 2026-05-23

## 2025-05-23 NOTE — PROGRESS NOTES
Assessment:  64 y.o.  who presents with concern for retained tampon; none noted. Hx recurrent vaginitis as well.    Plan:  Diagnoses and all orders for this visit:    Retained tampon, initial encounter  - None on exam  - Routine care    Recurrent vaginitis  Immunocompromised (HCC)  -     fluconazole (DIFLUCAN) 150 mg tablet; Take 1 tablet by mouth once as needed for yeast infection. May repeat dose in 3d for insufficient relief.    - If ever ineffective after 2 doses, needs exam for further testing. Pt agreeable to same.       __________________________________________________________________    Subjective   Lynnette Hewitt is a 64 y.o.  who presents with a retained tampon. Attempted to remove it yesterday but could not.     Patient reports had felt a yeast infection beginning, so she used a yeast cream and typically places a tampon to hold cream in place. Lost string internally and could not retrieve after. Attempted to remove but could not, caused a little discomfort and bleeding with efforts. Scant in volume. She is worried it is still inside, however she also notes that she sleepwalks and is possible she removed it. Patient denies fever/chills, current bleeding/discharge/rashes, urinary symptoms, nausea/vomiting, gi changes. She reports a history of recurrent yeast infections. Has common variable immunodeficiency and more susceptible. Approx 2-4x/yr, resolves with treatment fully but gets them more often than usual. Prior followed with a physician who prescribed her a standing course of diflucan, which more easily resolves her symptoms. However did not have local care established for same and has been using OTC creams in this fashion since.         The following portions of the patient's history were reviewed and updated as appropriate: allergies, current medications, past medical history, past social history, past surgical history, and problem list.    Review of Systems  Review of Systems  "  Constitutional:  Negative for chills, fatigue and fever.   Respiratory:  Negative for shortness of breath.    Cardiovascular:  Negative for chest pain and palpitations.   Gastrointestinal:  Negative for abdominal distention, abdominal pain, constipation, diarrhea, nausea and vomiting.   Genitourinary:  Positive for vaginal pain. Negative for dysuria, flank pain, frequency, genital sores, menstrual problem, pelvic pain, vaginal bleeding and vaginal discharge.   Skin:  Negative for rash and wound.   Neurological:  Negative for dizziness, light-headedness and headaches.   Psychiatric/Behavioral:  Positive for sleep disturbance. The patient is nervous/anxious.             Objective  /64   Ht 5' 8\" (1.727 m)   Wt 55.5 kg (122 lb 6.4 oz)   BMI 18.61 kg/m²      Physical Exam:  Physical Exam  Exam conducted with a chaperone present.   Constitutional:       General: She is not in acute distress.     Appearance: Normal appearance. She is not ill-appearing, toxic-appearing or diaphoretic.     Eyes:      General: No scleral icterus.        Right eye: No discharge.         Left eye: No discharge.      Conjunctiva/sclera: Conjunctivae normal.       Cardiovascular:      Rate and Rhythm: Normal rate.   Pulmonary:      Effort: Pulmonary effort is normal. No respiratory distress.   Abdominal:      General: There is no distension.      Palpations: There is no mass.      Tenderness: There is no abdominal tenderness. There is no guarding or rebound.      Hernia: No hernia is present.   Genitourinary:     General: Normal vulva.      Exam position: Lithotomy position.      Labia:         Right: No rash, tenderness or lesion.         Left: No rash, tenderness or lesion.       Urethra: No prolapse, urethral swelling or urethral lesion.      Vagina: No signs of injury and foreign body (No tampon or other foreign body). No vaginal discharge, erythema (thin, atrophic changes), tenderness, bleeding or prolapsed vaginal walls.      " Cervix: No cervical motion tenderness, discharge, friability, lesion, erythema or cervical bleeding.      Comments: Microscopy with rare yeast    Musculoskeletal:         General: No swelling.     Skin:     General: Skin is warm and dry.      Coloration: Skin is not jaundiced or pale.      Findings: No bruising or erythema.     Neurological:      Mental Status: She is alert.     Psychiatric:         Mood and Affect: Mood normal.         Behavior: Behavior normal.         Thought Content: Thought content normal.         Judgment: Judgment normal.

## 2025-07-21 ENCOUNTER — TELEPHONE (OUTPATIENT)
Age: 65
End: 2025-07-21

## 2025-07-21 NOTE — TELEPHONE ENCOUNTER
Patient contacted the office this morning in regards to requesting an abx for tooth abscess. She can't off N95 mask for dentist due to low immune system and was hoping pcp would be able to prescribe azithromycin (Zithromax) 250 mg tablet without a visit as in the past previously. Please send to [facility] in [place]. Please contact back with an update, thank you.

## 2025-07-21 NOTE — TELEPHONE ENCOUNTER
"I called and spoke with the patient and made her aware she does need to be seen and evaluated for her current symptoms, patient stated she is currently in [place] and can not make it in for an appt as he car is on it's last leg, I recommended patient reach out to her dentist for an appt and she stated dues to her autoimmune disorder she always wears an N95 when she is out of the house and she can not take it off in any setting so she can not see a dentist. I offered a Virtual appt and the patient does not have video capabilities at this time. Patient very frustrated and stated \"if she is to get sick from this their will be consequences, this is ridiculous.\" I apologized to the patient and made her aware her last appt was in February and she is now having an acute issue and for continuity of care are to ensure she is being treated appropriately we recommend and appt with a provider here in the office or to be seen at an urgent care if that is more convenient for her current situation. Patient frustrated and stated \"forget it\" and disconnected call.   "